# Patient Record
Sex: MALE | Race: BLACK OR AFRICAN AMERICAN | NOT HISPANIC OR LATINO | URBAN - METROPOLITAN AREA
[De-identification: names, ages, dates, MRNs, and addresses within clinical notes are randomized per-mention and may not be internally consistent; named-entity substitution may affect disease eponyms.]

---

## 2017-11-22 ENCOUNTER — HISTORICAL (OUTPATIENT)
Dept: ADMINISTRATIVE | Facility: HOSPITAL | Age: 47
End: 2017-11-22

## 2017-11-27 LAB — FINAL CULTURE: NORMAL

## 2017-11-28 LAB — FINAL CULTURE: NORMAL

## 2017-12-04 ENCOUNTER — HISTORICAL (OUTPATIENT)
Dept: ADMINISTRATIVE | Facility: HOSPITAL | Age: 47
End: 2017-12-04

## 2017-12-10 LAB
FINAL CULTURE: NORMAL
FINAL CULTURE: NORMAL

## 2018-05-19 ENCOUNTER — HISTORICAL (OUTPATIENT)
Dept: ADMINISTRATIVE | Facility: HOSPITAL | Age: 48
End: 2018-05-19

## 2018-05-21 LAB — FINAL CULTURE: NORMAL

## 2018-05-25 LAB
FINAL CULTURE: NORMAL
FINAL CULTURE: NORMAL

## 2019-01-27 ENCOUNTER — HISTORICAL (OUTPATIENT)
Dept: ADMINISTRATIVE | Facility: HOSPITAL | Age: 49
End: 2019-01-27

## 2019-02-02 LAB
FINAL CULTURE: NORMAL
FINAL CULTURE: NORMAL

## 2020-01-04 ENCOUNTER — HISTORICAL (OUTPATIENT)
Dept: ADMINISTRATIVE | Facility: HOSPITAL | Age: 50
End: 2020-01-04

## 2020-01-10 LAB
FINAL CULTURE: NORMAL
FINAL CULTURE: NORMAL

## 2020-05-03 ENCOUNTER — HISTORICAL (OUTPATIENT)
Dept: ADMINISTRATIVE | Facility: HOSPITAL | Age: 50
End: 2020-05-03

## 2020-05-09 LAB
FINAL CULTURE: NORMAL
FINAL CULTURE: NORMAL

## 2020-05-29 ENCOUNTER — HISTORICAL (OUTPATIENT)
Dept: ADMINISTRATIVE | Facility: HOSPITAL | Age: 50
End: 2020-05-29

## 2020-06-03 LAB — FINAL CULTURE: NORMAL

## 2020-06-04 LAB — FINAL CULTURE: NORMAL

## 2020-12-01 ENCOUNTER — HISTORICAL (OUTPATIENT)
Dept: ADMINISTRATIVE | Facility: HOSPITAL | Age: 50
End: 2020-12-01

## 2020-12-03 LAB — FINAL CULTURE: NORMAL

## 2020-12-08 LAB — FINAL CULTURE: NORMAL

## 2023-08-15 ENCOUNTER — HOSPITAL ENCOUNTER (INPATIENT)
Facility: HOSPITAL | Age: 53
LOS: 3 days | Discharge: HOME | DRG: 871 | End: 2023-08-18
Attending: EMERGENCY MEDICINE | Admitting: EMERGENCY MEDICINE
Payer: MEDICAID

## 2023-08-15 DIAGNOSIS — R60.9 SWELLING: ICD-10-CM

## 2023-08-15 DIAGNOSIS — J96.01 ACUTE RESPIRATORY FAILURE WITH HYPOXIA: ICD-10-CM

## 2023-08-15 DIAGNOSIS — N50.812 LEFT TESTICULAR PAIN: ICD-10-CM

## 2023-08-15 DIAGNOSIS — A41.9 SEPSIS, DUE TO UNSPECIFIED ORGANISM, UNSPECIFIED WHETHER ACUTE ORGAN DYSFUNCTION PRESENT: Primary | ICD-10-CM

## 2023-08-15 LAB
ALBUMIN SERPL-MCNC: 3.3 G/DL (ref 3.5–5)
ALBUMIN/GLOB SERPL: 0.8 RATIO (ref 1.1–2)
ALP SERPL-CCNC: 81 UNIT/L (ref 40–150)
ALT SERPL-CCNC: 19 UNIT/L (ref 0–55)
APPEARANCE UR: ABNORMAL
AST SERPL-CCNC: 20 UNIT/L (ref 5–34)
BACTERIA #/AREA URNS AUTO: ABNORMAL /HPF
BASOPHILS # BLD AUTO: 0.02 X10(3)/MCL
BASOPHILS NFR BLD AUTO: 0.1 %
BILIRUB SERPL-MCNC: 1.4 MG/DL
BILIRUB UR QL STRIP.AUTO: NEGATIVE
BUN SERPL-MCNC: 23.1 MG/DL (ref 8.4–25.7)
C TRACH DNA SPEC QL NAA+PROBE: NOT DETECTED
CALCIUM SERPL-MCNC: 9 MG/DL (ref 8.4–10.2)
CHLORIDE SERPL-SCNC: 109 MMOL/L (ref 98–107)
CO2 SERPL-SCNC: 24 MMOL/L (ref 22–29)
COLOR UR: ABNORMAL
CREAT SERPL-MCNC: 1.1 MG/DL (ref 0.73–1.18)
EOSINOPHIL # BLD AUTO: 0.03 X10(3)/MCL (ref 0–0.9)
EOSINOPHIL NFR BLD AUTO: 0.2 %
ERYTHROCYTE [DISTWIDTH] IN BLOOD BY AUTOMATED COUNT: 14.7 % (ref 11.5–17)
FLUAV AG UPPER RESP QL IA.RAPID: NOT DETECTED
FLUBV AG UPPER RESP QL IA.RAPID: NOT DETECTED
GFR SERPLBLD CREATININE-BSD FMLA CKD-EPI: >60 MLS/MIN/1.73/M2
GLOBULIN SER-MCNC: 4.3 GM/DL (ref 2.4–3.5)
GLUCOSE SERPL-MCNC: 128 MG/DL (ref 74–100)
GLUCOSE UR QL STRIP.AUTO: NEGATIVE
GRAN CASTS URNS QL MICRO: ABNORMAL /LPF
HCT VFR BLD AUTO: 36.9 % (ref 42–52)
HGB BLD-MCNC: 11.6 G/DL (ref 14–18)
HIV 1+2 AB+HIV1 P24 AG SERPL QL IA: NONREACTIVE
IMM GRANULOCYTES # BLD AUTO: 0.1 X10(3)/MCL (ref 0–0.04)
IMM GRANULOCYTES NFR BLD AUTO: 0.6 %
KETONES UR QL STRIP.AUTO: ABNORMAL
LACTATE SERPL-SCNC: 1.5 MMOL/L (ref 0.5–2.2)
LEUKOCYTE ESTERASE UR QL STRIP.AUTO: ABNORMAL
LYMPHOCYTES # BLD AUTO: 1.46 X10(3)/MCL (ref 0.6–4.6)
LYMPHOCYTES NFR BLD AUTO: 8.1 %
MCH RBC QN AUTO: 24.4 PG (ref 27–31)
MCHC RBC AUTO-ENTMCNC: 31.4 G/DL (ref 33–36)
MCV RBC AUTO: 77.5 FL (ref 80–94)
MONOCYTES # BLD AUTO: 1.68 X10(3)/MCL (ref 0.1–1.3)
MONOCYTES NFR BLD AUTO: 9.3 %
MUCOUS THREADS URNS QL MICRO: ABNORMAL /LPF
N GONORRHOEA DNA SPEC QL NAA+PROBE: NOT DETECTED
NEUTROPHILS # BLD AUTO: 14.76 X10(3)/MCL (ref 2.1–9.2)
NEUTROPHILS NFR BLD AUTO: 81.7 %
NITRITE UR QL STRIP.AUTO: NEGATIVE
NRBC BLD AUTO-RTO: 0 %
PH UR STRIP.AUTO: 8 [PH]
PLATELET # BLD AUTO: 192 X10(3)/MCL (ref 130–400)
PMV BLD AUTO: ABNORMAL FL
POTASSIUM SERPL-SCNC: 4.3 MMOL/L (ref 3.5–5.1)
PROT SERPL-MCNC: 7.6 GM/DL (ref 6.4–8.3)
PROT UR QL STRIP.AUTO: ABNORMAL
RBC # BLD AUTO: 4.76 X10(6)/MCL (ref 4.7–6.1)
RBC #/AREA URNS AUTO: >100 /HPF
RBC UR QL AUTO: ABNORMAL
SARS-COV-2 RNA RESP QL NAA+PROBE: NOT DETECTED
SODIUM SERPL-SCNC: 145 MMOL/L (ref 136–145)
SOURCE (OHS): NORMAL
SP GR UR STRIP.AUTO: 1.02 (ref 1–1.03)
SQUAMOUS #/AREA URNS AUTO: ABNORMAL /HPF
T PALLIDUM AB SER QL: NONREACTIVE
T VAGINALIS GENITAL QL WET PREP: NORMAL
UROBILINOGEN UR STRIP-ACNC: 1
WBC # SPEC AUTO: 18.05 X10(3)/MCL (ref 4.5–11.5)
WBC #/AREA URNS AUTO: ABNORMAL /HPF

## 2023-08-15 PROCEDURE — 85025 COMPLETE CBC W/AUTO DIFF WBC: CPT | Performed by: EMERGENCY MEDICINE

## 2023-08-15 PROCEDURE — 80053 COMPREHEN METABOLIC PANEL: CPT | Performed by: EMERGENCY MEDICINE

## 2023-08-15 PROCEDURE — 21400001 HC TELEMETRY ROOM

## 2023-08-15 PROCEDURE — 87591 N.GONORRHOEAE DNA AMP PROB: CPT | Performed by: INTERNAL MEDICINE

## 2023-08-15 PROCEDURE — 87040 BLOOD CULTURE FOR BACTERIA: CPT | Performed by: EMERGENCY MEDICINE

## 2023-08-15 PROCEDURE — 0240U COVID/FLU A&B PCR: CPT | Performed by: EMERGENCY MEDICINE

## 2023-08-15 PROCEDURE — 27000221 HC OXYGEN, UP TO 24 HOURS

## 2023-08-15 PROCEDURE — 25000003 PHARM REV CODE 250: Performed by: EMERGENCY MEDICINE

## 2023-08-15 PROCEDURE — 80074 ACUTE HEPATITIS PANEL: CPT | Performed by: INTERNAL MEDICINE

## 2023-08-15 PROCEDURE — 87088 URINE BACTERIA CULTURE: CPT | Performed by: EMERGENCY MEDICINE

## 2023-08-15 PROCEDURE — 25000003 PHARM REV CODE 250: Performed by: PHYSICIAN ASSISTANT

## 2023-08-15 PROCEDURE — 63600175 PHARM REV CODE 636 W HCPCS: Performed by: EMERGENCY MEDICINE

## 2023-08-15 PROCEDURE — 81001 URINALYSIS AUTO W/SCOPE: CPT | Performed by: EMERGENCY MEDICINE

## 2023-08-15 PROCEDURE — 25500020 PHARM REV CODE 255: Performed by: INTERNAL MEDICINE

## 2023-08-15 PROCEDURE — 96360 HYDRATION IV INFUSION INIT: CPT

## 2023-08-15 PROCEDURE — 86780 TREPONEMA PALLIDUM: CPT | Performed by: INTERNAL MEDICINE

## 2023-08-15 PROCEDURE — 99291 CRITICAL CARE FIRST HOUR: CPT

## 2023-08-15 PROCEDURE — 83605 ASSAY OF LACTIC ACID: CPT | Performed by: EMERGENCY MEDICINE

## 2023-08-15 PROCEDURE — 87389 HIV-1 AG W/HIV-1&-2 AB AG IA: CPT | Performed by: INTERNAL MEDICINE

## 2023-08-15 PROCEDURE — 87077 CULTURE AEROBIC IDENTIFY: CPT | Performed by: EMERGENCY MEDICINE

## 2023-08-15 PROCEDURE — 11000001 HC ACUTE MED/SURG PRIVATE ROOM

## 2023-08-15 PROCEDURE — 87210 SMEAR WET MOUNT SALINE/INK: CPT | Performed by: INTERNAL MEDICINE

## 2023-08-15 RX ORDER — ACETAMINOPHEN 325 MG/1
650 TABLET ORAL EVERY 8 HOURS PRN
Status: DISCONTINUED | OUTPATIENT
Start: 2023-08-15 | End: 2023-08-18 | Stop reason: HOSPADM

## 2023-08-15 RX ORDER — ALBUTEROL SULFATE 0.83 MG/ML
2.5 SOLUTION RESPIRATORY (INHALATION) EVERY 6 HOURS PRN
Status: ON HOLD | COMMUNITY
End: 2023-08-18 | Stop reason: HOSPADM

## 2023-08-15 RX ORDER — POLYETHYLENE GLYCOL 3350 17 G/17G
17 POWDER, FOR SOLUTION ORAL DAILY
Status: DISCONTINUED | OUTPATIENT
Start: 2023-08-16 | End: 2023-08-18 | Stop reason: HOSPADM

## 2023-08-15 RX ORDER — QUETIAPINE 400 MG/1
400 TABLET, FILM COATED, EXTENDED RELEASE ORAL NIGHTLY
Status: ON HOLD | COMMUNITY
End: 2023-08-18 | Stop reason: HOSPADM

## 2023-08-15 RX ORDER — IBUPROFEN 200 MG
24 TABLET ORAL
Status: DISCONTINUED | OUTPATIENT
Start: 2023-08-15 | End: 2023-08-18 | Stop reason: HOSPADM

## 2023-08-15 RX ORDER — ZIPRASIDONE HYDROCHLORIDE 40 MG/1
20 CAPSULE ORAL 2 TIMES DAILY
COMMUNITY

## 2023-08-15 RX ORDER — POLYETHYLENE GLYCOL 3350 17 G/17G
POWDER, FOR SOLUTION ORAL
Status: ON HOLD | COMMUNITY
End: 2023-08-18 | Stop reason: HOSPADM

## 2023-08-15 RX ORDER — PANTOPRAZOLE SODIUM 40 MG/1
40 TABLET, DELAYED RELEASE ORAL DAILY
COMMUNITY

## 2023-08-15 RX ORDER — ASPIRIN 325 MG
325 TABLET ORAL DAILY
Status: ON HOLD | COMMUNITY
End: 2023-08-18 | Stop reason: HOSPADM

## 2023-08-15 RX ORDER — DOXYCYCLINE HYCLATE 100 MG
100 TABLET ORAL EVERY 12 HOURS
Status: DISCONTINUED | OUTPATIENT
Start: 2023-08-16 | End: 2023-08-18 | Stop reason: HOSPADM

## 2023-08-15 RX ORDER — QUETIAPINE FUMARATE 200 MG/1
200 TABLET, FILM COATED ORAL DAILY
Status: ON HOLD | COMMUNITY
End: 2023-08-18 | Stop reason: HOSPADM

## 2023-08-15 RX ORDER — GLUCAGON 1 MG
1 KIT INJECTION
Status: DISCONTINUED | OUTPATIENT
Start: 2023-08-15 | End: 2023-08-18 | Stop reason: HOSPADM

## 2023-08-15 RX ORDER — NAPROXEN SODIUM 220 MG/1
81 TABLET, FILM COATED ORAL DAILY
Status: DISCONTINUED | OUTPATIENT
Start: 2023-08-16 | End: 2023-08-15

## 2023-08-15 RX ORDER — ACETAMINOPHEN 650 MG/20.3ML
650 LIQUID ORAL
Status: COMPLETED | OUTPATIENT
Start: 2023-08-15 | End: 2023-08-15

## 2023-08-15 RX ORDER — SODIUM CHLORIDE 9 MG/ML
INJECTION, SOLUTION INTRAVENOUS CONTINUOUS
Status: ACTIVE | OUTPATIENT
Start: 2023-08-15 | End: 2023-08-16

## 2023-08-15 RX ORDER — QUETIAPINE FUMARATE 100 MG/1
200 TABLET, FILM COATED ORAL NIGHTLY
Status: DISCONTINUED | OUTPATIENT
Start: 2023-08-16 | End: 2023-08-18 | Stop reason: HOSPADM

## 2023-08-15 RX ORDER — L. ACIDOPHILUS/L.BULGARICUS 100MM CELL
1 GRANULES IN PACKET (EA) ORAL 2 TIMES DAILY
Status: ON HOLD | COMMUNITY
End: 2023-08-18 | Stop reason: HOSPADM

## 2023-08-15 RX ORDER — ONDANSETRON 2 MG/ML
4 INJECTION INTRAMUSCULAR; INTRAVENOUS EVERY 4 HOURS PRN
Status: DISCONTINUED | OUTPATIENT
Start: 2023-08-15 | End: 2023-08-18 | Stop reason: HOSPADM

## 2023-08-15 RX ORDER — SCOLOPAMINE TRANSDERMAL SYSTEM 1 MG/1
1 PATCH, EXTENDED RELEASE TRANSDERMAL
Status: ON HOLD | COMMUNITY
End: 2023-08-18 | Stop reason: HOSPADM

## 2023-08-15 RX ORDER — ASPIRIN 325 MG
325 TABLET ORAL DAILY
Status: DISCONTINUED | OUTPATIENT
Start: 2023-08-16 | End: 2023-08-18 | Stop reason: HOSPADM

## 2023-08-15 RX ORDER — ASCORBIC ACID 500 MG
500 TABLET ORAL DAILY
Status: ON HOLD | COMMUNITY
End: 2023-08-18 | Stop reason: HOSPADM

## 2023-08-15 RX ORDER — ATORVASTATIN CALCIUM 10 MG/1
20 TABLET, FILM COATED ORAL DAILY
Status: DISCONTINUED | OUTPATIENT
Start: 2023-08-16 | End: 2023-08-18 | Stop reason: HOSPADM

## 2023-08-15 RX ORDER — IBUPROFEN 200 MG
16 TABLET ORAL
Status: DISCONTINUED | OUTPATIENT
Start: 2023-08-15 | End: 2023-08-18 | Stop reason: HOSPADM

## 2023-08-15 RX ORDER — ACETAMINOPHEN 325 MG/1
650 TABLET ORAL EVERY 4 HOURS PRN
Status: DISCONTINUED | OUTPATIENT
Start: 2023-08-15 | End: 2023-08-18 | Stop reason: HOSPADM

## 2023-08-15 RX ORDER — SODIUM CHLORIDE 0.9 % (FLUSH) 0.9 %
10 SYRINGE (ML) INJECTION EVERY 12 HOURS PRN
Status: DISCONTINUED | OUTPATIENT
Start: 2023-08-15 | End: 2023-08-18 | Stop reason: HOSPADM

## 2023-08-15 RX ORDER — IPRATROPIUM BROMIDE AND ALBUTEROL SULFATE 2.5; .5 MG/3ML; MG/3ML
3 SOLUTION RESPIRATORY (INHALATION) EVERY 6 HOURS PRN
Status: DISCONTINUED | OUTPATIENT
Start: 2023-08-16 | End: 2023-08-18 | Stop reason: HOSPADM

## 2023-08-15 RX ORDER — TIOTROPIUM BROMIDE 18 UG/1
18 CAPSULE ORAL; RESPIRATORY (INHALATION) DAILY
Status: ON HOLD | COMMUNITY
End: 2023-08-18 | Stop reason: HOSPADM

## 2023-08-15 RX ADMIN — PIPERACILLIN AND TAZOBACTAM 4.5 G: 4; .5 INJECTION, POWDER, LYOPHILIZED, FOR SOLUTION INTRAVENOUS; PARENTERAL at 03:08

## 2023-08-15 RX ADMIN — VANCOMYCIN HYDROCHLORIDE 1750 MG: 10 INJECTION, POWDER, LYOPHILIZED, FOR SOLUTION INTRAVENOUS at 03:08

## 2023-08-15 RX ADMIN — SODIUM CHLORIDE, POTASSIUM CHLORIDE, SODIUM LACTATE AND CALCIUM CHLORIDE 2598 ML: 600; 310; 30; 20 INJECTION, SOLUTION INTRAVENOUS at 01:08

## 2023-08-15 RX ADMIN — IOPAMIDOL 100 ML: 755 INJECTION, SOLUTION INTRAVENOUS at 07:08

## 2023-08-15 RX ADMIN — SODIUM CHLORIDE: 9 INJECTION, SOLUTION INTRAVENOUS at 06:08

## 2023-08-15 RX ADMIN — ACETAMINOPHEN 650 MG: 650 SOLUTION ORAL at 01:08

## 2023-08-15 NOTE — PROGRESS NOTES
"Pharmacokinetic Initial Assessment: IV Vancomycin    Assessment/Plan:    Initiate intravenous vancomycin with loading dose of 1750 mg once with subsequent doses when random concentrations are less than 20 mcg/mL  Desired empiric serum trough concentration is 15 to 20 mcg/mL  Draw vancomycin random level on 08/16 at 0430.  Pharmacy will continue to follow and monitor vancomycin.      Please contact pharmacy at extension [phone] with any questions regarding this assessment.     Thank you for the consult,   Maria Eugenia Vaughn       Patient brief summary:  Kirill Payan is a 52 y.o. male initiated on antimicrobial therapy with IV Vancomycin for treatment of suspected lower respiratory infection    Drug Allergies:   Review of patient's allergies indicates:  Not on File    Actual Body Weight:   86.6 kg    Renal Function:   Estimated Creatinine Clearance: 81.6 mL/min (based on SCr of 1.1 mg/dL).,     Dialysis Method (if applicable):  SANTHOSH (Scr 0.8-->1.10)    CBC (last 72 hours):  Recent Labs   Lab Result Units 08/15/23  1326   WBC x10(3)/mcL 18.05*   Hgb g/dL 11.6*   Hct % 36.9*   Platelet x10(3)/mcL 192   Mono % % 9.3   Eos % % 0.2   Basophil % % 0.1       Metabolic Panel (last 72 hours):  Recent Labs   Lab Result Units 08/15/23  1327   Sodium Level mmol/L 145   Potassium Level mmol/L 4.3   Chloride mmol/L 109*   Carbon Dioxide mmol/L 24   Glucose Level mg/dL 128*   Blood Urea Nitrogen mg/dL 23.1   Creatinine mg/dL 1.10   Albumin Level g/dL 3.3*   Bilirubin Total mg/dL 1.4   Alkaline Phosphatase unit/L 81   Aspartate Aminotransferase unit/L 20   Alanine Aminotransferase unit/L 19       Drug levels (last 3 results):  No results for input(s): "VANCOMYCINRA", "VANCORANDOM", "VANCOMYCINPE", "VANCOPEAK", "VANCOMYCINTR", "VANCOTROUGH" in the last 72 hours.    Microbiologic Results:  Microbiology Results (last 7 days)       Procedure Component Value Units Date/Time    Blood culture x two cultures. Draw prior to antibiotics. " [[id number]] Collected: 08/15/23 1327    Order Status: Resulted Specimen: Blood Updated: 08/15/23 1417    Blood culture x two cultures. Draw prior to antibiotics. [[id number]] Collected: 08/15/23 1327    Order Status: Sent Specimen: Blood     Respiratory Culture [[id number]]     Order Status: Sent Specimen: Sputum

## 2023-08-15 NOTE — H&P
[facility] Hospital Medicine History & Physical Examination       Patient Name: Kirill Payan  MRN: [id number]  Patient Class: IP- Inpatient   Admission Date: 8/15/2023   Admitting Physician: Rosa Morales MD   Length of Stay: 0  Attending Physician: Rosa Morales MD   Primary Care Provider: No primary care provider on file.  Face-to-Face encounter date: 08/15/2023  Code Status: Full Code  Chief Complaint: Fever (Patient from NH for fever and hypotension.  Pt b/p 111/71 on arrival.  Currently has aspiration pneumonia.  )        Patient information was obtained from patient, patient's family, past medical records and ER records.     MD addendum-    53 yo male , a nursing home resident with PMHx of CVA with aphasia , dysphagia on PEG tube feeds, Rt sided hemiparesis, HTN, Schizophrenia, Depression/ Anxiety was brought in for evaluation of fever and hypoxia. It was reported from NH that  pt had a swollen and tender left  testicle with fever of 102 and had low oxygen on 8/11 needed oxygen via  NC at 2L/min . On arrival to the ED today pt had temp of 99.9, /71, , RR 22 and sating 97% on 2L. Workup include U/S scrotum and testicle showed possible left epididymitis. No penile discharge reported and pt has a male external catheter. CT abd/pelvis showed possible left lower lobe pneumonia , possible proctitis and cystitis. UA suggestive of UTI, WBC 18K, Lactic 1.5. Blood cultures and urine cultures were obtained. Pt was given Vancomycin and Zosyn in the ED and  was called for admission.  O2 wean down to RA with SpO2 >95%    History obtained from reviewing medical records  as pt was not able to provide any history and no family was around     P/E- NAD, Pt is awake , aphasic , nods head to answer question, Lungs- clear , Heart- S1/S2, Abd- obese, soft, BS+, PEG tube in place , Ext- No edema     A/P-  Sepsis due to Urinary tract infection along with other possible source -  epididymitis, proctitis, and left lower lobe pneumonia/ CAP evident on imaging study   H/O CVA with consequent aphasia, dysphagia and PEG tube status   Rt hemiparesis   Leukocytosis in the setting of infection   Microcytic anemia     Hx- HTN, Schizophrenia , Anxiety, Depression    Plan-  Blood cultures x2  Urine culture  Respiratory culture   MRSA PCR  STI workup though pt is at a low risk- Chlamydia/ GC PCR/ Trichomonas/ HIV/Hepatitis panel and RPR with reflex   Antibiotic - Zosyn and Doxycycline are selected to cover above mentioned multiple sources of infection   IVF resuscitation as indicated   RD consult for PEG feeds   Resume home meds once med reconciled         HISTORY OF PRESENT ILLNESS:   Kirill Payan is a 52 y.o. Black or  male with a past medical history of hypertension, depression/anxiety, schizophrenia and CVA with right-sided weakness, aphasic and PEG tube. The patient presented to [facility] on 8/15/2023 with a primary complaint of fever and hypoxia.  Patient oxygen saturation decreased on 08/11/2023 in which he was placed on 2 L nasal cannula.  Fever reached a max of 102° F. patient was also noted to have a swollen testicle with pain while in the nursing home.    Upon presentation to the ED, temperature 99.9° F, heart rate 109, blood pressure 111/71, respiratory rate 22 and SpO2 97% on 2 L nasal cannula.  Labs with WBC 18, lactic acid 1.5, H&H 11.6/36.9.  Chest x-ray with no acute findings.  Ultrasound of the scrotum and testicle revealed findings suggestive of large left varicocele with possible left epididymitis unable to be excluded, low-grade right varicocele.  In ED patient received Tylenol, Zosyn and vancomycin.  He is admitted to hospital medicine services for further medical management.    PAST MEDICAL HISTORY:   Hypertension  Depression/anxiety  Schizophrenia   CVA with right hemiparalysis and aphasic with PEG tube     PAST SURGICAL HISTORY:   Unable to obtain due to  medical condition    ALLERGIES:   No known allergies     FAMILY HISTORY:   Reviewed and negative    SOCIAL HISTORY:   Unable to obtain due to medical condition    HOME MEDICATIONS:   As documented    REVIEW OF SYSTEMS:   Unable to obtain due to medical condition    PHYSICAL EXAM:     VITAL SIGNS: 24 HRS MIN & MAX LAST   Temp  Min: 99.9 °F (37.7 °C)  Max: 99.9 °F (37.7 °C) 99.9 °F (37.7 °C)   BP  Min: 92/59  Max: 111/71 (!) 92/59   Pulse  Min: 109  Max: 117  (!) 112   Resp  Min: 22  Max: 22 (!) 22   SpO2  Min: 96 %  Max: 97 % 96 %       General appearance: Well-developed, well-nourished male in no apparent distress.  No family at bedside.  HEENT: Atraumatic head. Moist mucous membranes of oral cavity.  Lungs: Clear to auscultation bilaterally.   Heart: Regular rate and rhythm.   Abdomen: Soft, non-distended, non-tender. Bowel sounds are normal.  Peg tube in place without erythema or drainage.  Extremities: No cyanosis, clubbing. No deformities.  Skin: No Rash. Warm and dry.  Eschar to lateral left lower extremity.  Neuro: Awake, alert and aphasic.       LABS AND IMAGING:     Recent Labs   Lab 08/15/23  1326   WBC 18.05*   RBC 4.76   HGB 11.6*   HCT 36.9*   MCV 77.5*   MCH 24.4*   MCHC 31.4*   RDW 14.7          Recent Labs   Lab 08/15/23  1327      K 4.3   CO2 24   BUN 23.1   CREATININE 1.10   CALCIUM 9.0   ALBUMIN 3.3*   ALKPHOS 81   ALT 19   AST 20   BILITOT 1.4       Microbiology Results (last 7 days)       Procedure Component Value Units Date/Time    Blood culture x two cultures. Draw prior to antibiotics. [[id number]] Collected: 08/15/23 1327    Order Status: Resulted Specimen: Blood Updated: 08/15/23 1417    Blood culture x two cultures. Draw prior to antibiotics. [[id number]] Collected: 08/15/23 1327    Order Status: Sent Specimen: Blood     Respiratory Culture [[id number]]     Order Status: Sent Specimen: Sputum              US Scrotum And Testicles  EXAMINATION  US SCROTUM AND TESTICLES    CLINICAL  HISTORY  Edema, unspecified    TECHNIQUE  Grayscale and Doppler interrogation of the scrotum and testes.    COMPARISON  None available at the time of initial interpretation.    FINDINGS  Exam quality: adequate for evaluation    Right Testicle: Surface contour intact and smooth. No evidence of focal mass or infiltrative parenchymal abnormality.  No significant enlargement or heterogeneity of the epididymis.    Left Testicle: Surface contour intact and smooth. No evidence of focal mass or infiltrative parenchymal abnormality.  The left epididymis is markedly enlarged and heterogeneous, with appearance of diffuse hypervascularity by Doppler interrogation.    Doppler Interrogation: Spontaneous arterial flow is demonstrated within each testicle, with symmetric velocity and normal low-resistance spectral waveform.  Bilateral venous outflow is maintained.    Other findings: Minimal left hydrocele is present.  No significant right scrotal fluid is identified.  There are findings consistent with moderate to high-grade left varicocele, as well as minimal right varicocele.    Measurements:    *Right testicle: 4.5 cm x 1.8 cm x 2.7 cm (11 cc)  *Left testicle: 3.6 cm x 2.1 cm x 2.4 cm (9 cc)    IMPRESSION  1. Findings suggestive of large left varicocele with possibility of left epididymitis not excluded.  2. Low-grade right varicocele.  3. No definite evidence of active testicular torsion.    Electronically signed by: Umer Bermudez  Date:    08/15/2023  Time:    14:04  X-Ray Chest AP Portable  Narrative: EXAMINATION:  XR CHEST AP PORTABLE    CLINICAL HISTORY:  Sepsis;    COMPARISON:  29 December 2021    FINDINGS:  Portable frontal view of the chest was obtained. The heart is not enlarged.  Improved aeration of the right lung base compared to prior.  No new focal consolidation or pneumothorax.  There is dextroscoliosis.  Impression: No acute findings.    Electronically signed by: Tim  Jonathan  Date:    08/15/2023  Time:    13:03        ASSESSMENT & PLAN:   Assessment:  Large left varicocele, small right varicocele  ?  Left epididymitis  Microcytic anemia  History of hypertension, depression/anxiety, schizophrenia and CVA with right-sided weakness, aphasic and PEG tube    Plan:  - Continue with vancomycin and Zosyn   - UA and blood cultures ordered.  Follow results   - STD panel ordered  - Tylenol as needed for fever   - Resume appropriate home medications when deemed necessary   - Labs in AM      VTE Prophylaxis: will be placed on SCD for DVT prophylaxis and will be advised to be as mobile as possible and sit in a chair as tolerated      __________________________________________________________________________  INPATIENT LIST OF MEDICATIONS     Current Facility-Administered Medications:     0.9%  NaCl infusion, , Intravenous, Continuous, Rosi Dominguez, PA-ARVIND    acetaminophen tablet 650 mg, 650 mg, Oral, Q8H PRN, Rosi Dominguez, PA-C    acetaminophen tablet 650 mg, 650 mg, Oral, Q4H PRN, Rosi Dominguez, PA-ARVIND    dextrose 10% bolus 125 mL 125 mL, 12.5 g, Intravenous, PRN, Rosi Dominguez, PA-C    dextrose 10% bolus 250 mL 250 mL, 25 g, Intravenous, PRN, Rosi Dominguez, PA-ARVIND    glucagon (human recombinant) injection 1 mg, 1 mg, Intramuscular, PRNAlberto Kallie E., PA-C    glucose chewable tablet 16 g, 16 g, Oral, PRN, Rosi Dominguez, PA-C    glucose chewable tablet 24 g, 24 g, Oral, PRN, Rosi Dominguez, PA-C    ondansetron injection 4 mg, 4 mg, Intravenous, Q4H PRN, Rosi Dominguez, PA-ARVIND    piperacillin-tazobactam (ZOSYN) 4.5 g in dextrose 5 % in water (D5W) 100 mL IVPB (MB+), 4.5 g, Intravenous, Once, Jessica Lindsey MD, Last Rate: 200 mL/hr at 08/15/23 1536, 4.5 g at 08/15/23 1536    piperacillin-tazobactam (ZOSYN) 4.5 g in dextrose 5 % in water (D5W) 100 mL IVPB (MB+), 4.5 g, Intravenous, Q8H, Rosi Dominguez PA-C    sodium chloride 0.9% flush 10 mL, 10 mL,  Intravenous, Q12H PRN, Rosi Dominguez PA-C    vancomycin (VANCOCIN) 1,750 mg in dextrose 5 % (D5W) 500 mL IVPB, 1,750 mg, Intravenous, Once, Jessica Lindsey MD    Pharmacy to dose Vancomycin consult, , , Once **AND** vancomycin - pharmacy to dose, , Intravenous, pharmacy to manage frequency, Rosi Dominguez PA-C  No current outpatient medications on file.      Scheduled Meds:   piperacillin-tazobactam (Zosyn) IV (PEDS and ADULTS) (extended infusion is not appropriate)  4.5 g Intravenous Once    piperacillin-tazobactam (Zosyn) IV (PEDS and ADULTS) (extended infusion is not appropriate)  4.5 g Intravenous Q8H    vancomycin (VANCOCIN) IV (PEDS and ADULTS)  1,750 mg Intravenous Once     Continuous Infusions:   sodium chloride 0.9%       PRN Meds:.acetaminophen, acetaminophen, dextrose 10%, dextrose 10%, glucagon (human recombinant), glucose, glucose, ondansetron, sodium chloride 0.9%, Pharmacy to dose Vancomycin consult **AND** vancomycin - pharmacy to dose      Discharge Planning and Disposition: Anticipated discharge to be determined.    I, DAVID Byers, have reviewed and discussed the case with Dr. Rosa Morales MD    Please see the following addendum for further assessment and plan from there attending MD.    Rosi Dominguez PA-C  08/15/2023

## 2023-08-15 NOTE — ED PROVIDER NOTES
Encounter Date: 8/15/2023    SCRIBE #1 NOTE: I, Rola Connelly, vincent scribing for, and in the presence of,  Jessica Lindsey MD. I have scribed the following portions of the note - Other sections scribed: HPI, ROS, PE.       History     Chief Complaint   Patient presents with    Fever     Patient from NH for fever and hypotension.  Pt b/p 111/71 on arrival.  Currently has aspiration pneumonia.       52 year old male presents to the ED via EMS from a nursing home for fever and decreased oxygen saturation. Per EMS the patient's oxygen dropped on Friday and he was started on 2L of oxygen. Prior to Friday, he was not on home oxygen. The patient had a fever of 102 and his CBG was 127 en route. The nursing home informed EMS that the patient has a swollen testicle causing him pain.  EMS notes that the patient is at his baseline. He was diagnosed with pneumonia from aspiration recently.     The history is provided by the EMS personnel. No  was used.     Review of patient's allergies indicates:  Not on File  No past medical history on file.  No past surgical history on file.  No family history on file.     Review of Systems   Constitutional:  Positive for fever. Negative for chills and diaphoresis.   HENT:  Negative for congestion, ear pain, sinus pain and sore throat.    Eyes:  Negative for pain, discharge and visual disturbance.   Respiratory:  Negative for cough, shortness of breath, wheezing and stridor.    Cardiovascular:  Negative for chest pain and palpitations.   Gastrointestinal:  Negative for abdominal pain, constipation, diarrhea, nausea, rectal pain and vomiting.   Genitourinary:  Positive for testicular pain (Left). Negative for dysuria and hematuria.   Musculoskeletal:  Negative for back pain and myalgias.   Skin:  Negative for rash.   Neurological:  Negative for dizziness, syncope, numbness and headaches.   Hematological: Negative.    Psychiatric/Behavioral: Negative.     All other  systems reviewed and are negative.      Physical Exam     Initial Vitals [08/15/23 1232]   BP Pulse Resp Temp SpO2   111/71 109 (!) 22 99.9 °F (37.7 °C) 97 %      MAP       --         Physical Exam    Nursing note and vitals reviewed.  Constitutional: He appears well-developed. He is not diaphoretic. No distress.   Appears generally weak  Ill-appearing but answering questions   HENT:   Head: Normocephalic and atraumatic.   Nose: Nose normal.   Eyes: Conjunctivae and EOM are normal. Pupils are equal, round, and reactive to light.   Neck: Trachea normal. Neck supple.   Normal range of motion.  Cardiovascular:  Regular rhythm, normal heart sounds and intact distal pulses.     Exam reveals no gallop and no friction rub.       No murmur heard.  Tachycardic     Pulmonary/Chest: No respiratory distress. He has no wheezes. He has no rhonchi. He exhibits no tenderness.   Coarse breath sounds bilaterally   Abdominal: Abdomen is soft. Bowel sounds are normal. He exhibits no distension and no mass. There is no abdominal tenderness.   Peg tube in left upper quadrant  There is no rebound.   Genitourinary:    Genitourinary Comments: Left testicle is tender and swollen      Musculoskeletal:         General: No tenderness or edema. Normal range of motion.      Cervical back: Normal range of motion and neck supple.      Lumbar back: Normal. No tenderness. Normal range of motion.      Comments: Cremasteric reflex     Neurological: He is alert.   H/o cva with aphasia and right sided weakness, able to answer yes or no questions   Skin: Skin is warm and dry. Capillary refill takes less than 2 seconds. No rash and no abscess noted. No erythema. No pallor.   Does not appear to have a sacral decubitus cyst   Psychiatric: He has a normal mood and affect. His behavior is normal. Judgment and thought content normal.         ED Course   Critical Care    Date/Time: 8/15/2023 2:53 PM    Performed by: Jessica Lindsey MD  Authorized by:  Jessica Lindsey MD  Direct patient critical care time: 45 minutes  Total critical care time (exclusive of procedural time) : 45 minutes  Critical care was necessary to treat or prevent imminent or life-threatening deterioration of the following conditions: sepsis, circulatory failure and respiratory failure.  Critical care was time spent personally by me on the following activities: development of treatment plan with patient or surrogate, discussions with consultants, evaluation of patient's response to treatment, examination of patient, obtaining history from patient or surrogate, ordering and performing treatments and interventions, ordering and review of laboratory studies, ordering and review of radiographic studies, pulse oximetry, re-evaluation of patient's condition and review of old charts.        Labs Reviewed   COMPREHENSIVE METABOLIC PANEL - Abnormal; Notable for the following components:       Result Value    Chloride 109 (*)     Glucose Level 128 (*)     Albumin Level 3.3 (*)     Globulin 4.3 (*)     Albumin/Globulin Ratio 0.8 (*)     All other components within normal limits   CBC WITH DIFFERENTIAL - Abnormal; Notable for the following components:    WBC 18.05 (*)     Hgb 11.6 (*)     Hct 36.9 (*)     MCV 77.5 (*)     MCH 24.4 (*)     MCHC 31.4 (*)     Neut # 14.76 (*)     Mono # 1.68 (*)     IG# 0.10 (*)     All other components within normal limits   LACTIC ACID, PLASMA - Normal   BLOOD CULTURE [facility]   BLOOD CULTURE [facility]   RESPIRATORY CULTURE ([facility])   CBC W/ AUTO DIFFERENTIAL    Narrative:     The following orders were created for panel order CBC auto differential.  Procedure                               Abnormality         Status                     ---------                               -----------         ------                     CBC with Differential[[id number]]        Abnormal            Final result                 Please view results for these tests on the individual orders.   URINALYSIS,  REFLEX TO URINE CULTURE   COVID/FLU A&B PCR          Imaging Results              US Scrotum And Testicles (Final result)  Result time 08/15/23 14:04:19      Final result by Umer Bermudez MD (08/15/23 14:04:19)                   Narrative:    EXAMINATION  US SCROTUM AND TESTICLES    CLINICAL HISTORY  Edema, unspecified    TECHNIQUE  Grayscale and Doppler interrogation of the scrotum and testes.    COMPARISON  None available at the time of initial interpretation.    FINDINGS  Exam quality: adequate for evaluation    Right Testicle: Surface contour intact and smooth. No evidence of focal mass or infiltrative parenchymal abnormality.  No significant enlargement or heterogeneity of the epididymis.    Left Testicle: Surface contour intact and smooth. No evidence of focal mass or infiltrative parenchymal abnormality.  The left epididymis is markedly enlarged and heterogeneous, with appearance of diffuse hypervascularity by Doppler interrogation.    Doppler Interrogation: Spontaneous arterial flow is demonstrated within each testicle, with symmetric velocity and normal low-resistance spectral waveform.  Bilateral venous outflow is maintained.    Other findings: Minimal left hydrocele is present.  No significant right scrotal fluid is identified.  There are findings consistent with moderate to high-grade left varicocele, as well as minimal right varicocele.      Measurements:    *Right testicle: 4.5 cm x 1.8 cm x 2.7 cm (11 cc)  *Left testicle: 3.6 cm x 2.1 cm x 2.4 cm (9 cc)    IMPRESSION  1. Findings suggestive of large left varicocele with possibility of left epididymitis not excluded.  2. Low-grade right varicocele.  3. No definite evidence of active testicular torsion.      Electronically signed by: Umer Bermudez  Date:    08/15/2023  Time:    14:04                                     X-Ray Chest AP Portable (Final result)  Result time 08/15/23 13:03:13      Final result by Tim Castillo MD (08/15/23 13:03:13)                           Final result by Tim Castillo MD (08/15/23 13:03:11)                   Impression:      No acute findings.      Electronically signed by: Tim Castillo  Date:    08/15/2023  Time:    13:03               Narrative:    EXAMINATION:  XR CHEST AP PORTABLE    CLINICAL HISTORY:  Sepsis;    COMPARISON:  29 December 2021    FINDINGS:  Portable frontal view of the chest was obtained. The heart is not enlarged.  Improved aeration of the right lung base compared to prior.  No new focal consolidation or pneumothorax.  There is dextroscoliosis.                                    X-Rays:   Independently Interpreted Readings:   Chest X-Ray: Normal heart size.  No infiltrates.  No acute abnormalities.     Medications   piperacillin-tazobactam (ZOSYN) 4.5 g in dextrose 5 % in water (D5W) 100 mL IVPB (MB+) (has no administration in time range)   vancomycin (VANCOCIN) 1,750 mg in dextrose 5 % (D5W) 500 mL IVPB (has no administration in time range)   piperacillin-tazobactam (ZOSYN) 4.5 g in dextrose 5 % in water (D5W) 100 mL IVPB (MB+) (has no administration in time range)   lactated ringers bolus 2,598 mL (2,598 mLs Intravenous New Bag 8/15/23 1359)   acetaminophen oral solution 650 mg (650 mg Per G Tube Given 8/15/23 1359)     Medical Decision Making  The differential diagnosis includes, but is not limited to: sepsis, renal failure, pneumonia, cellulitis, or UTI, epididymitis, abscess  Cbc, cmp, lactic, ua, blood cultures, cxr, testicular us ordered and reviewed  Leukocytosis iwthout lactic acidosis, us with ?epididymitis, on exam possibly cellulitis as well, given 30 cc/kg ivf and broad spectrum antibiotics, plan to admit to hospitalist for sepsis  May be due to epididymitis, not sexually active, vanc and zosyn should be sufficient    Problems Addressed:  Acute respiratory failure with hypoxia: acute illness or injury that poses a threat to life or bodily functions  Left testicular pain: acute illness or  "injury that poses a threat to life or bodily functions  Sepsis, due to unspecified organism, unspecified whether acute organ dysfunction present: acute illness or injury that poses a threat to life or bodily functions    Amount and/or Complexity of Data Reviewed  Independent Historian: EMS     Details: Per EMS the patient's oxygen dropped on Friday and he was started on 2L of oxygen. Prior to Friday, he was not on home oxygen. The patient had a fever of 102 and his CBG was 127 en route. The nursing home informed EMS that the patient has a swollen testicle causing him pain.  EMS notes that the patient is at his baseline. He was diagnosed with pneumonia from aspiration recently.  External Data Reviewed: notes.  Labs: ordered. Decision-making details documented in ED Course.  Radiology: ordered and independent interpretation performed. Decision-making details documented in ED Course.    Risk  OTC drugs.  Prescription drug management.  Decision regarding hospitalization.    Critical Care  Total time providing critical care: 45 minutes       Medical Decision Making:   History:   I obtained history from: EMS provider.  Old Medical Records: I decided to obtain old medical records.  Old Records Summarized: records from previous admission(s).  Initial Assessment:   See hpi  Independently Interpreted Test(s):   I have ordered and independently interpreted X-rays - see prior notes.  Clinical Tests:   Lab Tests: Ordered and Reviewed  Radiological Study: Ordered and Reviewed  Sepsis Perfusion Assessment: "I attest a sepsis perfusion exam was performed within 6 hours of sepsis, severe sepsis, or septic shock presentation, following fluid resuscitation."    Sepsis Perfusion Assessment Complete: 8/15/2023 2:56 PM    Other:   I have discussed this case with another health care provider.          Scribe Attestation:   Scribe #1: I performed the above scribed service and the documentation accurately describes the services I performed. " I attest to the accuracy of the note.  Comments: Attending:   Physician Attestation Statement for Scribe #1: IJessica MD, personally performed the services described in this documentation. All medical record entries made by the scribe were at my direction and in my presence.  I have reviewed the chart and agree that the record reflects my personal performance and is accurate and complete.        Attending Attestation:           Physician Attestation for Scribe:  Physician Attestation Statement for Scribe #1: IJessica MD, reviewed documentation, as scribed by Rola Connelly in my presence, and it is both accurate and complete.             ED Course as of 08/15/23 1516   Tue Aug 15, 2023   1322 Reported aspiration pneumonia, does have thickened sputum however there is no evidence of being prescribed abx [BS]   1328 Just got over covid 1 week ago, out of seclusion 6 days ago, today is the testicular swelling and requiring supplemental O2 [BS]   1443 Pt to be straight cath'd for urine [BS]      ED Course User Index  [BS] Jessica Lindsey MD                 Clinical Impression:   Final diagnoses:  [R60.9] Swelling  [A41.9] Sepsis, due to unspecified organism, unspecified whether acute organ dysfunction present (Primary)  [N50.812] Left testicular pain  [J96.01] Acute respiratory failure with hypoxia        ED Disposition Condition    Admit Stable                Jessica Lindsey MD  08/15/23 1516

## 2023-08-16 LAB
ALBUMIN SERPL-MCNC: 2.8 G/DL (ref 3.5–5)
ALBUMIN/GLOB SERPL: 0.8 RATIO (ref 1.1–2)
ALP SERPL-CCNC: 69 UNIT/L (ref 40–150)
ALT SERPL-CCNC: 16 UNIT/L (ref 0–55)
AST SERPL-CCNC: 20 UNIT/L (ref 5–34)
BASOPHILS # BLD AUTO: 0.02 X10(3)/MCL
BASOPHILS NFR BLD AUTO: 0.2 %
BILIRUB SERPL-MCNC: 1.5 MG/DL
BUN SERPL-MCNC: 13.6 MG/DL (ref 8.4–25.7)
CALCIUM SERPL-MCNC: 8.5 MG/DL (ref 8.4–10.2)
CHLORIDE SERPL-SCNC: 110 MMOL/L (ref 98–107)
CO2 SERPL-SCNC: 21 MMOL/L (ref 22–29)
CREAT SERPL-MCNC: 0.82 MG/DL (ref 0.73–1.18)
EOSINOPHIL # BLD AUTO: 0.08 X10(3)/MCL (ref 0–0.9)
EOSINOPHIL NFR BLD AUTO: 0.7 %
ERYTHROCYTE [DISTWIDTH] IN BLOOD BY AUTOMATED COUNT: 14.6 % (ref 11.5–17)
GFR SERPLBLD CREATININE-BSD FMLA CKD-EPI: >60 MLS/MIN/1.73/M2
GLOBULIN SER-MCNC: 3.7 GM/DL (ref 2.4–3.5)
GLUCOSE SERPL-MCNC: 85 MG/DL (ref 74–100)
HAV IGM SERPL QL IA: NONREACTIVE
HBV CORE IGM SERPL QL IA: NONREACTIVE
HBV SURFACE AG SERPL QL IA: NONREACTIVE
HCT VFR BLD AUTO: 31.6 % (ref 42–52)
HCV AB SERPL QL IA: NONREACTIVE
HGB BLD-MCNC: 10.3 G/DL (ref 14–18)
IMM GRANULOCYTES # BLD AUTO: 0.05 X10(3)/MCL (ref 0–0.04)
IMM GRANULOCYTES NFR BLD AUTO: 0.4 %
LYMPHOCYTES # BLD AUTO: 1.25 X10(3)/MCL (ref 0.6–4.6)
LYMPHOCYTES NFR BLD AUTO: 10.2 %
MAGNESIUM SERPL-MCNC: 2.3 MG/DL (ref 1.6–2.6)
MCH RBC QN AUTO: 25.4 PG (ref 27–31)
MCHC RBC AUTO-ENTMCNC: 32.6 G/DL (ref 33–36)
MCV RBC AUTO: 78 FL (ref 80–94)
MONOCYTES # BLD AUTO: 1.18 X10(3)/MCL (ref 0.1–1.3)
MONOCYTES NFR BLD AUTO: 9.7 %
MRSA PCR SCRN (OHS): NOT DETECTED
NEUTROPHILS # BLD AUTO: 9.63 X10(3)/MCL (ref 2.1–9.2)
NEUTROPHILS NFR BLD AUTO: 78.8 %
NRBC BLD AUTO-RTO: 0 %
PHOSPHATE SERPL-MCNC: 3.2 MG/DL (ref 2.3–4.7)
PLATELET # BLD AUTO: 182 X10(3)/MCL (ref 130–400)
PMV BLD AUTO: 13.3 FL (ref 7.4–10.4)
POCT GLUCOSE: 78 MG/DL (ref 70–110)
POTASSIUM SERPL-SCNC: 3.9 MMOL/L (ref 3.5–5.1)
PROT SERPL-MCNC: 6.5 GM/DL (ref 6.4–8.3)
RBC # BLD AUTO: 4.05 X10(6)/MCL (ref 4.7–6.1)
SODIUM SERPL-SCNC: 142 MMOL/L (ref 136–145)
WBC # SPEC AUTO: 12.21 X10(3)/MCL (ref 4.5–11.5)

## 2023-08-16 PROCEDURE — 63600175 PHARM REV CODE 636 W HCPCS: Performed by: INTERNAL MEDICINE

## 2023-08-16 PROCEDURE — 21400001 HC TELEMETRY ROOM

## 2023-08-16 PROCEDURE — 63600175 PHARM REV CODE 636 W HCPCS: Performed by: PHYSICIAN ASSISTANT

## 2023-08-16 PROCEDURE — 25000003 PHARM REV CODE 250: Performed by: INTERNAL MEDICINE

## 2023-08-16 PROCEDURE — 85025 COMPLETE CBC W/AUTO DIFF WBC: CPT | Performed by: PHYSICIAN ASSISTANT

## 2023-08-16 PROCEDURE — 27000221 HC OXYGEN, UP TO 24 HOURS

## 2023-08-16 PROCEDURE — 80053 COMPREHEN METABOLIC PANEL: CPT | Performed by: PHYSICIAN ASSISTANT

## 2023-08-16 PROCEDURE — 84100 ASSAY OF PHOSPHORUS: CPT | Performed by: INTERNAL MEDICINE

## 2023-08-16 PROCEDURE — 25000003 PHARM REV CODE 250: Performed by: PHYSICIAN ASSISTANT

## 2023-08-16 PROCEDURE — 87641 MR-STAPH DNA AMP PROBE: CPT | Performed by: INTERNAL MEDICINE

## 2023-08-16 PROCEDURE — 25000242 PHARM REV CODE 250 ALT 637 W/ HCPCS: Performed by: INTERNAL MEDICINE

## 2023-08-16 PROCEDURE — 83735 ASSAY OF MAGNESIUM: CPT | Performed by: INTERNAL MEDICINE

## 2023-08-16 RX ORDER — ZIPRASIDONE MESYLATE 20 MG/ML
20 INJECTION, POWDER, LYOPHILIZED, FOR SOLUTION INTRAMUSCULAR EVERY 12 HOURS PRN
Status: DISCONTINUED | OUTPATIENT
Start: 2023-08-16 | End: 2023-08-18 | Stop reason: HOSPADM

## 2023-08-16 RX ORDER — DOXYLAMINE SUCCINATE 25 MG
TABLET ORAL 2 TIMES DAILY
Status: DISCONTINUED | OUTPATIENT
Start: 2023-08-16 | End: 2023-08-18 | Stop reason: HOSPADM

## 2023-08-16 RX ORDER — ENOXAPARIN SODIUM 100 MG/ML
40 INJECTION SUBCUTANEOUS EVERY 24 HOURS
Status: DISCONTINUED | OUTPATIENT
Start: 2023-08-16 | End: 2023-08-18 | Stop reason: HOSPADM

## 2023-08-16 RX ADMIN — ENOXAPARIN SODIUM 40 MG: 40 INJECTION SUBCUTANEOUS at 07:08

## 2023-08-16 RX ADMIN — PIPERACILLIN AND TAZOBACTAM 4.5 G: 4; .5 INJECTION, POWDER, LYOPHILIZED, FOR SOLUTION INTRAVENOUS; PARENTERAL at 08:08

## 2023-08-16 RX ADMIN — ACETAMINOPHEN 650 MG: 325 TABLET, FILM COATED ORAL at 08:08

## 2023-08-16 RX ADMIN — DOXYCYCLINE HYCLATE 100 MG: 100 TABLET, COATED ORAL at 08:08

## 2023-08-16 RX ADMIN — ASPIRIN 325 MG ORAL TABLET 325 MG: 325 PILL ORAL at 08:08

## 2023-08-16 RX ADMIN — THERA TABS 1 TABLET: TAB at 08:08

## 2023-08-16 RX ADMIN — PIPERACILLIN AND TAZOBACTAM 4.5 G: 4; .5 INJECTION, POWDER, LYOPHILIZED, FOR SOLUTION INTRAVENOUS; PARENTERAL at 12:08

## 2023-08-16 RX ADMIN — TIOTROPIUM BROMIDE INHALATION SPRAY 2 PUFF: 3.12 SPRAY, METERED RESPIRATORY (INHALATION) at 08:08

## 2023-08-16 RX ADMIN — ATORVASTATIN CALCIUM 20 MG: 10 TABLET, FILM COATED ORAL at 08:08

## 2023-08-16 RX ADMIN — POLYETHYLENE GLYCOL 3350 17 G: 17 POWDER, FOR SOLUTION ORAL at 08:08

## 2023-08-16 RX ADMIN — PIPERACILLIN AND TAZOBACTAM 4.5 G: 4; .5 INJECTION, POWDER, LYOPHILIZED, FOR SOLUTION INTRAVENOUS; PARENTERAL at 03:08

## 2023-08-16 RX ADMIN — SODIUM CHLORIDE: 9 INJECTION, SOLUTION INTRAVENOUS at 11:08

## 2023-08-16 RX ADMIN — QUETIAPINE FUMARATE 200 MG: 100 TABLET ORAL at 08:08

## 2023-08-16 NOTE — CONSULTS
Inpatient Nutrition Assessment    Admit Date: 8/15/2023   Total duration of encounter: 1 day     Nutrition Recommendation/Prescription     Resume NH tube feeding Nutren 2.0 @ 50mL/hr to provide:   2000 kcal (100% est needs)  80g protein (91% est needs)  690 mL free water   *calculations based on tube feeding running over est 20hrs/daily    Rec resume free water flushes 300mL 5x's daily to meet 100% est fluid needs.       Communication of Recommendations: reviewed with provider and reviewed with nurse    Nutrition Assessment     Malnutrition Assessment/Nutrition-Focused Physical Exam     Last assessed on 8/16/23:  Malnutrition Level: other (see comments) (does not meet criteria)  Energy Intake (Malnutrition): other (see comments) (does not meet criteria)  Weight Loss (Malnutrition): other (see comments) (unable to obtain)  Subcutaneous Fat (Malnutrition): other (see comments) (does not meet criteria)           Muscle Mass (Malnutrition): other (see comments) (does not meet criteria)                          Fluid Accumulation (Malnutrition): other (see comments) (does not meet criteria)        A minimum of two characteristics is recommended for diagnosis of either severe or non-severe malnutrition.    Chart Review    Reason Seen: physician consult for PEG tube feeds , MST    Malnutrition Screening Tool Results   Have you recently lost weight without trying?: Unsure  Have you been eating poorly because of a decreased appetite?: No   MST Score: 2     Diagnosis:  Swelling  Sepsis, due to unspecified organism, unspecified whether acute organ dysfunction present (Primary)  Left testicular pain  Acute respiratory failure with hypoxia       Relevant Medical History:  CVA with aphasia, dysphagia on PEG tube feeds, Rt sided hemiparesis, HTN, Schizophrenia, Depression/ Anxiety     Nutrition-Related Medications: MVI. Polyethylene glycol, NaCl IVF's at 100mL/hr  Calorie Containing IV Medications: no significant kcals from  "medications at this time    Nutrition-Related Labs:  : Chl 110    Diet/PN Order: No diet orders on file  Oral Supplement Order: none  Tube Feeding Order: none  Appetite/Oral Intake: NPO/NPO  Factors Affecting Nutritional Intake: NPO  Food/Church/Cultural Preferences: none reported  Food Allergies: no known food allergies    Skin Integrity: wound  Wound(s):   altered skin integrity noted sacral spine and lower leg    Comments    23: Patient hx obtained from chart and nursing home. Noted patient with aphasia. RN from nursing home reports patient tolerating tube feeding via PEG prior to admit. Patient receiving Nutren 2.0 at 50mL/hr over 14hrs/daily from 6pm-8am with 300mL free water flushes 5x daily. After discussion with MD and RN, will resume NH tube feeding order today, however will run over 20hrs estimated daily while in the hospital to better meet est protein-energy needs.      Anthropometrics    Height: 5' 7" (170.2 cm)    Last Weight: 86.6 kg (191 lb) (08/15/23 1232)    BMI (Calculated): 29.9  BMI Classification: overweight (BMI 25-29.9)        Ideal Body Weight (IBW), Male: 148 lb     % Ideal Body Weight, Male (lb): 129.05 %                          Usual Weight Provided By: EMR weight history, not able to obtain UBW at this time. Called nursing home to inquire about any unintentional wt loss, however RN could not verify wt hx.     Wt Readings from Last 5 Encounters:   08/15/23 86.6 kg (191 lb)     Weight Change(s) Since Admission:  Admit Weight: 86.6 kg (191 lb) (08/15/23 1232)  .    Estimated Needs    Weight Used For Calorie Calculations: 86.6 kg (190 lb 14.7 oz)  Energy Calorie Requirements (kcal): 2213-6881 kcal/day (mifflin st jeor x 1-1.2 SF)  Energy Need Method: [place]-St Jeor  Weight Used For Protein Calculations: 86.6 kg (190 lb 14.7 oz)  Protein Requirements:  g/day (1-1.2g/kg/d)  Fluid Requirements (mL): 2003 mL/day (1mL/kcal)  Temp (24hrs), Av.9 °F (37.7 °C), Min:98.6 °F (37 " °C), Max:101.1 °F (38.4 °C)       Enteral Nutrition    Patient not receiving enteral nutrition at this time.    Parenteral Nutrition    Patient not receiving parenteral nutrition support at this time.    Evaluation of Received Nutrient Intake    Calories: not meeting estimated needs  Protein: not meeting estimated needs    Patient Education    Not applicable.    Nutrition Diagnosis     PES: Suboptimal oral intake related to  Hx aphasia and dysphagia as evidenced by PEG tube for nutrition support. (new)    Interventions/Goals     Intervention(s): modified rate of enteral nutrition and collaboration with other providers  Goal: Meet greater than 75% of nutritional needs by follow-up. (new)    Monitoring & Evaluation     Dietitian will monitor energy intake.  Nutrition Risk/Follow-Up: moderate (follow-up in 3-5 days)   Please consult if re-assessment needed sooner.

## 2023-08-17 LAB
ALBUMIN SERPL-MCNC: 2.7 G/DL (ref 3.5–5)
ALBUMIN/GLOB SERPL: 0.8 RATIO (ref 1.1–2)
ALP SERPL-CCNC: 66 UNIT/L (ref 40–150)
ALT SERPL-CCNC: 18 UNIT/L (ref 0–55)
AST SERPL-CCNC: 23 UNIT/L (ref 5–34)
BACTERIA UR CULT: NO GROWTH
BASOPHILS # BLD AUTO: 0.01 X10(3)/MCL
BASOPHILS NFR BLD AUTO: 0.2 %
BILIRUB SERPL-MCNC: 0.9 MG/DL
BUN SERPL-MCNC: 15.2 MG/DL (ref 8.4–25.7)
CALCIUM SERPL-MCNC: 8.1 MG/DL (ref 8.4–10.2)
CHLORIDE SERPL-SCNC: 111 MMOL/L (ref 98–107)
CO2 SERPL-SCNC: 23 MMOL/L (ref 22–29)
CREAT SERPL-MCNC: 0.79 MG/DL (ref 0.73–1.18)
EOSINOPHIL # BLD AUTO: 0.08 X10(3)/MCL (ref 0–0.9)
EOSINOPHIL NFR BLD AUTO: 1.8 %
ERYTHROCYTE [DISTWIDTH] IN BLOOD BY AUTOMATED COUNT: 14.5 % (ref 11.5–17)
GFR SERPLBLD CREATININE-BSD FMLA CKD-EPI: >60 MLS/MIN/1.73/M2
GLOBULIN SER-MCNC: 3.6 GM/DL (ref 2.4–3.5)
GLUCOSE SERPL-MCNC: 113 MG/DL (ref 74–100)
HCT VFR BLD AUTO: 31.6 % (ref 42–52)
HGB BLD-MCNC: 9.9 G/DL (ref 14–18)
IMM GRANULOCYTES # BLD AUTO: 0.02 X10(3)/MCL (ref 0–0.04)
IMM GRANULOCYTES NFR BLD AUTO: 0.4 %
LYMPHOCYTES # BLD AUTO: 1.26 X10(3)/MCL (ref 0.6–4.6)
LYMPHOCYTES NFR BLD AUTO: 27.8 %
MAGNESIUM SERPL-MCNC: 2 MG/DL (ref 1.6–2.6)
MCH RBC QN AUTO: 24.5 PG (ref 27–31)
MCHC RBC AUTO-ENTMCNC: 31.3 G/DL (ref 33–36)
MCV RBC AUTO: 78.2 FL (ref 80–94)
MONOCYTES # BLD AUTO: 0.59 X10(3)/MCL (ref 0.1–1.3)
MONOCYTES NFR BLD AUTO: 13 %
NEUTROPHILS # BLD AUTO: 2.58 X10(3)/MCL (ref 2.1–9.2)
NEUTROPHILS NFR BLD AUTO: 56.8 %
NRBC BLD AUTO-RTO: 0 %
PLATELET # BLD AUTO: 205 X10(3)/MCL (ref 130–400)
PMV BLD AUTO: ABNORMAL FL
POTASSIUM SERPL-SCNC: 3.6 MMOL/L (ref 3.5–5.1)
PROT SERPL-MCNC: 6.3 GM/DL (ref 6.4–8.3)
RBC # BLD AUTO: 4.04 X10(6)/MCL (ref 4.7–6.1)
SODIUM SERPL-SCNC: 141 MMOL/L (ref 136–145)
WBC # SPEC AUTO: 4.54 X10(3)/MCL (ref 4.5–11.5)

## 2023-08-17 PROCEDURE — 36410 VNPNXR 3YR/> PHY/QHP DX/THER: CPT

## 2023-08-17 PROCEDURE — 25000003 PHARM REV CODE 250: Performed by: NURSE PRACTITIONER

## 2023-08-17 PROCEDURE — C1751 CATH, INF, PER/CENT/MIDLINE: HCPCS

## 2023-08-17 PROCEDURE — 25000003 PHARM REV CODE 250: Performed by: PHYSICIAN ASSISTANT

## 2023-08-17 PROCEDURE — 80053 COMPREHEN METABOLIC PANEL: CPT | Performed by: STUDENT IN AN ORGANIZED HEALTH CARE EDUCATION/TRAINING PROGRAM

## 2023-08-17 PROCEDURE — 25000003 PHARM REV CODE 250: Performed by: INTERNAL MEDICINE

## 2023-08-17 PROCEDURE — 63600175 PHARM REV CODE 636 W HCPCS: Performed by: PHYSICIAN ASSISTANT

## 2023-08-17 PROCEDURE — 63600175 PHARM REV CODE 636 W HCPCS: Performed by: INTERNAL MEDICINE

## 2023-08-17 PROCEDURE — 25000003 PHARM REV CODE 250: Performed by: STUDENT IN AN ORGANIZED HEALTH CARE EDUCATION/TRAINING PROGRAM

## 2023-08-17 PROCEDURE — 85025 COMPLETE CBC W/AUTO DIFF WBC: CPT | Performed by: STUDENT IN AN ORGANIZED HEALTH CARE EDUCATION/TRAINING PROGRAM

## 2023-08-17 PROCEDURE — 76937 US GUIDE VASCULAR ACCESS: CPT

## 2023-08-17 PROCEDURE — 83735 ASSAY OF MAGNESIUM: CPT | Performed by: STUDENT IN AN ORGANIZED HEALTH CARE EDUCATION/TRAINING PROGRAM

## 2023-08-17 PROCEDURE — A4216 STERILE WATER/SALINE, 10 ML: HCPCS | Performed by: NURSE PRACTITIONER

## 2023-08-17 PROCEDURE — 21400001 HC TELEMETRY ROOM

## 2023-08-17 RX ORDER — SODIUM CHLORIDE 0.9 % (FLUSH) 0.9 %
10 SYRINGE (ML) INJECTION
Status: DISCONTINUED | OUTPATIENT
Start: 2023-08-17 | End: 2023-08-18 | Stop reason: HOSPADM

## 2023-08-17 RX ORDER — SODIUM CHLORIDE 0.9 % (FLUSH) 0.9 %
10 SYRINGE (ML) INJECTION EVERY 6 HOURS
Status: DISCONTINUED | OUTPATIENT
Start: 2023-08-17 | End: 2023-08-18 | Stop reason: HOSPADM

## 2023-08-17 RX ADMIN — MICONAZOLE NITRATE: 20 CREAM TOPICAL at 11:08

## 2023-08-17 RX ADMIN — ATORVASTATIN CALCIUM 20 MG: 10 TABLET, FILM COATED ORAL at 11:08

## 2023-08-17 RX ADMIN — DOXYCYCLINE HYCLATE 100 MG: 100 TABLET, COATED ORAL at 09:08

## 2023-08-17 RX ADMIN — POLYETHYLENE GLYCOL 3350 17 G: 17 POWDER, FOR SOLUTION ORAL at 11:08

## 2023-08-17 RX ADMIN — Medication 10 ML: at 11:08

## 2023-08-17 RX ADMIN — MICONAZOLE NITRATE: 20 CREAM TOPICAL at 09:08

## 2023-08-17 RX ADMIN — DOXYCYCLINE HYCLATE 100 MG: 100 TABLET, COATED ORAL at 11:08

## 2023-08-17 RX ADMIN — PIPERACILLIN AND TAZOBACTAM 4.5 G: 4; .5 INJECTION, POWDER, LYOPHILIZED, FOR SOLUTION INTRAVENOUS; PARENTERAL at 03:08

## 2023-08-17 RX ADMIN — PIPERACILLIN AND TAZOBACTAM 4.5 G: 4; .5 INJECTION, POWDER, LYOPHILIZED, FOR SOLUTION INTRAVENOUS; PARENTERAL at 11:08

## 2023-08-17 RX ADMIN — PIPERACILLIN AND TAZOBACTAM 4.5 G: 4; .5 INJECTION, POWDER, LYOPHILIZED, FOR SOLUTION INTRAVENOUS; PARENTERAL at 09:08

## 2023-08-17 RX ADMIN — MICONAZOLE NITRATE: 20 CREAM TOPICAL at 04:08

## 2023-08-17 RX ADMIN — QUETIAPINE FUMARATE 200 MG: 100 TABLET ORAL at 09:08

## 2023-08-17 RX ADMIN — ASPIRIN 325 MG ORAL TABLET 325 MG: 325 PILL ORAL at 11:08

## 2023-08-17 RX ADMIN — THERA TABS 1 TABLET: TAB at 11:08

## 2023-08-17 RX ADMIN — ENOXAPARIN SODIUM 40 MG: 40 INJECTION SUBCUTANEOUS at 06:08

## 2023-08-17 NOTE — PROGRESS NOTES
53 yo male , a nursing home resident with PMHx of CVA with aphasia , dysphagia on PEG tube feeds, Rt sided hemiparesis, HTN, Schizophrenia, Depression/ Anxiety was brought in for evaluation of fever and hypoxia. It was reported from NH that  pt had a swollen and tender left  testicle with fever of 102 and had low oxygen on 8/11 needed oxygen via  NC at 2L/min . On arrival to the ED today pt had temp of 99.9, /71, , RR 22 and sating 97% on 2L. Workup include U/S scrotum and testicle showed possible left epididymitis. No penile discharge reported and pt has a male external catheter. CT abd/pelvis showed possible left lower lobe pneumonia , possible proctitis and cystitis. UA suggestive of UTI, WBC 18K, Lactic 1.5. Blood cultures and urine cultures were obtained. Pt was given Vancomycin and Zosyn in the ED and  was called for admission.  O2 wean down to RA with SpO2 >95%     Neck lesions noted suspicion for tinea corporis.       P/E-   NAD, Pt is awake , aphasic , nods head to answer question,   Lungs- clear ,   Heart- S1/S2, Abd- obese, soft, BS+, PEG tube in place ,   Ext- No edema      A/P-  Sepsis due to Urinary tract infection along with other possible source - epididymitis, proctitis, and left lower lobe pneumonia/ CAP evident on imaging study   H/O CVA with consequent aphasia, dysphagia and PEG tube status   Rt hemiparesis   Leukocytosis in the setting of infection   Microcytic anemia      Hx- HTN, Schizophrenia , Anxiety, Depression     Plan-  - follow the cultures  - KOH prep for neck and torso lesions, suspecting tinea corporis, will start miconazole ointment.   Blood cultures x2  Urine culture  Respiratory culture   MRSA PCR  STI workup though pt is at a low risk- Chlamydia/ GC PCR/ Trichomonas/ HIV/Hepatitis panel and RPR with reflex   Antibiotic - Zosyn and Doxycycline are selected to cover above mentioned multiple sources of infection   IVF resuscitation as indicated   RD consult for PEG  feeds   Resume home meds once med reconciled     VITAL SIGNS: 24 HRS MIN & MAX LAST   Temp  Min: 98.6 °F (37 °C)  Max: 101.1 °F (38.4 °C) 99.1 °F (37.3 °C)   BP  Min: 99/64  Max: 127/62 103/77   Pulse  Min: 81  Max: 92  83   Resp  Min: 15  Max: 18 18   SpO2  Min: 94 %  Max: 99 % 95 %       Labs, Microbiology and Imaging were Reviewed.      Microbiology Results (last 7 days)       Procedure Component Value Units Date/Time    Blood culture x two cultures. Draw prior to antibiotics. [[id number]]  (Normal) Collected: 08/15/23 1327    Order Status: Completed Specimen: Blood Updated: 08/16/23 1500     CULTURE, BLOOD ([facility]) No Growth At 24 Hours    JUAN DAVID Prep [[id number]]     Order Status: Sent Specimen: Skin from Neck     Respiratory Culture [[id number]]  (Abnormal) Collected: 08/15/23 1646    Order Status: Completed Specimen: Sputum, Expectorated Updated: 08/16/23 0751     Respiratory Culture Many Gram-negative Rods    Urine culture [[id number]] Collected: 08/15/23 1631    Order Status: Completed Specimen: Urine Updated: 08/16/23 0643     Urine Culture No Growth At 24 Hours    Blood culture x two cultures. Draw prior to antibiotics. [[id number]] Collected: 08/15/23 1327    Order Status: Resulted Specimen: Blood Updated: 08/15/23 2041    Chlamydia/GC, PCR [[id number]] Collected: 08/15/23 1631    Order Status: Completed Specimen: Urine Updated: 08/15/23 1832     Chlamydia trachomatis PCR Not Detected     N. gonorrhea PCR Not Detected     Source Urine    Narrative:      The Xpert CT/NG test, performed on the GeneXpert system is a qualitative in vitro real-time polymerase chain reaction (PCR) test for the automated detected and differentiation for genomic DNA from Chlamydia trachomatis (CT) and/or Neisseria gonorrhoeae (NG).    Trichomonas Prep [place] [[id number]] Collected: 08/15/23 1631    Order Status: Completed Specimen: Urine from Bladder Updated: 08/15/23 1714     TRICHOMONAS([facility]) Negative: No Trichomonas vaginalis observed     STD Urine (CT/GC/Trich) [[id number]] Collected: 08/15/23 1641    Order Status: Canceled Specimen: Urine Updated: 08/15/23 1641               Medications   aspirin  325 mg Per G Tube Daily    atorvastatin  20 mg Per G Tube Daily    doxycycline  100 mg Per G Tube Q12H    enoxparin  40 mg Subcutaneous Q24H (prophylaxis, 1700)    miconazole   Topical (Top) BID    multivitamin  1 tablet Per G Tube Daily    piperacillin-tazobactam (Zosyn) IV (PEDS and ADULTS) (extended infusion is not appropriate)  4.5 g Intravenous Q8H    polyethylene glycol  17 g Per G Tube Daily    QUEtiapine  200 mg Per G Tube QHS    tiotropium bromide  2 puff Inhalation Daily        acetaminophen, acetaminophen, albuterol-ipratropium, dextrose 10%, dextrose 10%, glucagon (human recombinant), glucose, glucose, ondansetron, sodium chloride 0.9%, ziprasidone     Radiology:  CT Abdomen Pelvis With Contrast  Narrative: EXAMINATION:  CT ABDOMEN PELVIS WITH CONTRAST    CLINICAL HISTORY:  Sepsis;    TECHNIQUE:  Helical acquisition through the abdomen and pelvis with IV contrast.  Three plane reconstructions were provided for review. DLP 3183 mGycm. Automatic exposure control, adjustment of mA/kV or iterative reconstruction technique was used to reduce radiation.    COMPARISON:  29 December 2021    FINDINGS:  There are some patchy left basilar opacities.  There is gynecomastia.    There is no significant abnormality of the liver.  There are gallstones.  No significant pericholecystic inflammation.  The spleen, pancreas and adrenals are within normal limits.  There is no hydronephrosis.  No suspicious renal lesion.    No bowel obstruction.  A gastrostomy tube is in place, balloon inflated distal stomach.  There is some rectal wall thickening.  No perforation or abscess.  Small fat containing paraumbilical hernia.    There is bladder wall thickening.  There is some air in the urinary bladder.  No ascites.  Abdominal aorta normal in caliber.  Mild  atherosclerotic disease.    Mild degenerative change of the spine.  Impression: 1. Suspect proctitis.  2. Bladder wall thickening, question cystitis.  3. Possible left lower lobe pneumonia.  4. Other chronic findings above.    Electronically signed by: Tim Castillo  Date:    08/15/2023  Time:    19:07  US Scrotum And Testicles  EXAMINATION  US SCROTUM AND TESTICLES    CLINICAL HISTORY  Edema, unspecified    TECHNIQUE  Grayscale and Doppler interrogation of the scrotum and testes.    COMPARISON  None available at the time of initial interpretation.    FINDINGS  Exam quality: adequate for evaluation    Right Testicle: Surface contour intact and smooth. No evidence of focal mass or infiltrative parenchymal abnormality.  No significant enlargement or heterogeneity of the epididymis.    Left Testicle: Surface contour intact and smooth. No evidence of focal mass or infiltrative parenchymal abnormality.  The left epididymis is markedly enlarged and heterogeneous, with appearance of diffuse hypervascularity by Doppler interrogation.    Doppler Interrogation: Spontaneous arterial flow is demonstrated within each testicle, with symmetric velocity and normal low-resistance spectral waveform.  Bilateral venous outflow is maintained.    Other findings: Minimal left hydrocele is present.  No significant right scrotal fluid is identified.  There are findings consistent with moderate to high-grade left varicocele, as well as minimal right varicocele.    Measurements:    *Right testicle: 4.5 cm x 1.8 cm x 2.7 cm (11 cc)  *Left testicle: 3.6 cm x 2.1 cm x 2.4 cm (9 cc)    IMPRESSION  1. Findings suggestive of large left varicocele with possibility of left epididymitis not excluded.  2. Low-grade right varicocele.  3. No definite evidence of active testicular torsion.    Electronically signed by: Umer Bermudez  Date:    08/15/2023  Time:    14:04  X-Ray Chest AP Portable  Narrative: EXAMINATION:  XR CHEST AP PORTABLE    CLINICAL  HISTORY:  Sepsis;    COMPARISON:  29 December 2021    FINDINGS:  Portable frontal view of the chest was obtained. The heart is not enlarged.  Improved aeration of the right lung base compared to prior.  No new focal consolidation or pneumothorax.  There is dextroscoliosis.  Impression: No acute findings.    Electronically signed by: Tim Castillo  Date:    08/15/2023  Time:    13:03    All diagnosis and differential diagnosis have been reviewed; assessment and plan has been documented; I have personally reviewed the labs and test results that are presently available; I have reviewed the patients medication list; I have reviewed the consulting providers response and recommendations. I have reviewed or attempted to review medical records based upon their availability.       Jalil Kaminski MD   08/16/2023

## 2023-08-17 NOTE — PROCEDURES
"Kirill Payan is a 52 y.o. male patient.    Temp: 97.9 °F (36.6 °C) (08/17/23 0028)  Pulse: 90 (08/17/23 0028)  Resp: 18 (08/16/23 1149)  BP: 102/69 (08/17/23 0028)  SpO2: (!) 90 % (08/17/23 0028)  Weight: 86.6 kg (191 lb) (08/15/23 1232)  Height: 5' 7" (170.2 cm) (08/15/23 1232)    PICC  Date/Time: 8/17/2023 2:02 AM  Performed by: Rainer Singh, RN  Consent Done: Yes  Time out: Immediately prior to procedure a time out was called to verify the correct patient, procedure, equipment, support staff and site/side marked as required  Indications: med administration and vascular access  Anesthesia: local infiltration  Local anesthetic: lidocaine 1% without epinephrine  Anesthetic Total (mL): 5  Preparation: skin prepped with ChloraPrep  Skin prep agent dried: skin prep agent completely dried prior to procedure  Sterile barriers: all five maximum sterile barriers used - cap, mask, sterile gown, sterile gloves, and large sterile sheet  Hand hygiene: hand hygiene performed prior to central venous catheter insertion  Location details: left brachial  Catheter type: single lumen  Catheter size: 4 Fr  Catheter Length: 17cm    Ultrasound guidance: yes  Vessel Caliber: patent, compressibility normal  Vascular Doppler: not done  Needle advanced into vessel with real time Ultrasound guidance.  Guidewire confirmed in vessel.  Sterile sheath used.  no esophageal manometryNumber of attempts: 1  Post-procedure: blood return through all ports, sterile dressing applied and chlorhexidine patch    Assessment: successful placement  Complications: none          Rainer Singh RN  8/17/2023    "

## 2023-08-17 NOTE — PLAN OF CARE
08/17/23 1032   Discharge Assessment   Assessment Type Discharge Planning Assessment   Confirmed/corrected address, phone number and insurance Yes   Confirmed Demographics Correct on Facesheet   Source of Information family  (Brother-in-law: (Primary Contact): [phone])   If unable to respond/provide information was family/caregiver contacted? Yes   Contact Name/Number Brother-in-law: (Primary Contact): [phone]   Communicated AFSHAN with patient/caregiver Date not available/Unable to determine   Reason For Admission R60.9 Swelling  J96.01 Acute respiratory failure with hypoxia  N50.812 Left testicular pain  A41.9 Sepsis, due to unspecified organism, unspecified whether acute organ dysfunction present   People in Home   (Patient is a long-term resident of [facility] ([place], [place]).)   Facility Arrived From: Patient was transported from [facility] to [facility] via ambulance services.   Do you expect to return to your current living situation? Yes   Do you have help at home or someone to help you manage your care at home? Yes   Who are your caregiver(s) and their phone number(s)? Nursing atss at [facility] provide for the patient's needs. Patient was admitted for long-term care on: 11/03/2016.   Prior to hospitilization cognitive status: Not Oriented to Person;Not Oriented to Place;Not Oriented to Time   Current cognitive status: Not Oriented to Person;Not Oriented to Place;Not Oriented to Time   Walking or Climbing Stairs transferring difficulty, requires equipment   Mobility Management Staff at [facility] use a Petros Lift for transfer from the bed to a W/C or Martha-chair PRN.   Dressing/Bathing bathing difficulty, assistance 1 person   Dressing/Bathing Management [facility] and CNA staff ([facility]) provide for ADLs, bathing/showering needs.   Home Accessibility wheelchair accessible   Home Layout Able to live on 1st floor   Equipment  Currently Used at Home wheelchair;lift device   Readmission within 30 days? No   Patient currently being followed by outpatient case management? No   Do you currently have service(s) that help you manage your care at home? Yes   Name and Contact number of agency Patient is along-term resident of [facility] as he was admitted on: 2016.   Is the pt/caregiver preference to resume services with current agency Yes   Do you take prescription medications? Yes   Do you have prescription coverage? Yes   Coverage Payor:  MEDICAID - MEDICAID OF [place]   Do you have any problems affording any of your prescribed medications? No   Is the patient taking medications as prescribed? yes   Who is going to help you get home at discharge? The nursing staff at [facility] provide for his pharmacological needs and dispense medications as per MD orders.   How do you get to doctors appointments? agency   Are you on dialysis? No   Do you take coumadin? No   DME Needed Upon Discharge  none   Discharge Plan discussed with: Caregiver   Name(s) and Number(s) Brother-in-law: Dalton Pulido: [phone]   Transition of Care Barriers None   Discharge Plan A Return to nursing home  (Patient will return to [facility] as he is a long-term resident of this facility since: 2016.)   Discharge Plan B Return to Nursing Home   Social Connections   How often do you attend Sikhism or Tenriism services? Never   Do you belong to any clubs or organizations such as Sikhism groups, unions, fraternal or athletic groups, or school groups? No   How often do you attend meetings of the clubs or organizations you belong to? Never   Are you , , , , never , or living with a partner?    Alcohol Use   Q1: How often do you have a drink containing alcohol? Never   Q2: How many drinks containing alcohol do you have on a typical day when you are drinking? None   Q3: How often do you  have six or more drinks on one occasion? Never

## 2023-08-18 VITALS
TEMPERATURE: 99 F | HEIGHT: 67 IN | BODY MASS INDEX: 29.98 KG/M2 | SYSTOLIC BLOOD PRESSURE: 106 MMHG | HEART RATE: 78 BPM | WEIGHT: 191 LBS | OXYGEN SATURATION: 100 % | RESPIRATION RATE: 20 BRPM | DIASTOLIC BLOOD PRESSURE: 76 MMHG

## 2023-08-18 PROBLEM — J96.01 ACUTE RESPIRATORY FAILURE WITH HYPOXIA: Status: ACTIVE | Noted: 2023-08-18

## 2023-08-18 LAB
ALBUMIN SERPL-MCNC: 2.6 G/DL (ref 3.5–5)
ALBUMIN/GLOB SERPL: 0.7 RATIO (ref 1.1–2)
ALP SERPL-CCNC: 70 UNIT/L (ref 40–150)
ALT SERPL-CCNC: 16 UNIT/L (ref 0–55)
AST SERPL-CCNC: 21 UNIT/L (ref 5–34)
BASOPHILS # BLD AUTO: 0.01 X10(3)/MCL
BASOPHILS NFR BLD AUTO: 0.2 %
BILIRUB SERPL-MCNC: 0.8 MG/DL
BUN SERPL-MCNC: 11.2 MG/DL (ref 8.4–25.7)
CALCIUM SERPL-MCNC: 8.2 MG/DL (ref 8.4–10.2)
CHLORIDE SERPL-SCNC: 108 MMOL/L (ref 98–107)
CO2 SERPL-SCNC: 25 MMOL/L (ref 22–29)
CREAT SERPL-MCNC: 0.79 MG/DL (ref 0.73–1.18)
EOSINOPHIL # BLD AUTO: 0.1 X10(3)/MCL (ref 0–0.9)
EOSINOPHIL NFR BLD AUTO: 2.4 %
ERYTHROCYTE [DISTWIDTH] IN BLOOD BY AUTOMATED COUNT: 14.3 % (ref 11.5–17)
GFR SERPLBLD CREATININE-BSD FMLA CKD-EPI: >60 MLS/MIN/1.73/M2
GLOBULIN SER-MCNC: 3.6 GM/DL (ref 2.4–3.5)
GLUCOSE SERPL-MCNC: 117 MG/DL (ref 74–100)
HCT VFR BLD AUTO: 31.6 % (ref 42–52)
HGB BLD-MCNC: 9.9 G/DL (ref 14–18)
IMM GRANULOCYTES # BLD AUTO: 0.03 X10(3)/MCL (ref 0–0.04)
IMM GRANULOCYTES NFR BLD AUTO: 0.7 %
LYMPHOCYTES # BLD AUTO: 1.31 X10(3)/MCL (ref 0.6–4.6)
LYMPHOCYTES NFR BLD AUTO: 31.6 %
MCH RBC QN AUTO: 24.5 PG (ref 27–31)
MCHC RBC AUTO-ENTMCNC: 31.3 G/DL (ref 33–36)
MCV RBC AUTO: 78.2 FL (ref 80–94)
MONOCYTES # BLD AUTO: 0.66 X10(3)/MCL (ref 0.1–1.3)
MONOCYTES NFR BLD AUTO: 15.9 %
NEUTROPHILS # BLD AUTO: 2.04 X10(3)/MCL (ref 2.1–9.2)
NEUTROPHILS NFR BLD AUTO: 49.2 %
NRBC BLD AUTO-RTO: 0 %
PATH REV: NORMAL
PLATELET # BLD AUTO: 204 X10(3)/MCL (ref 130–400)
PMV BLD AUTO: 13.8 FL (ref 7.4–10.4)
POTASSIUM SERPL-SCNC: 3.8 MMOL/L (ref 3.5–5.1)
PROT SERPL-MCNC: 6.2 GM/DL (ref 6.4–8.3)
RBC # BLD AUTO: 4.04 X10(6)/MCL (ref 4.7–6.1)
SODIUM SERPL-SCNC: 140 MMOL/L (ref 136–145)
WBC # SPEC AUTO: 4.15 X10(3)/MCL (ref 4.5–11.5)

## 2023-08-18 PROCEDURE — 63600175 PHARM REV CODE 636 W HCPCS: Performed by: PHYSICIAN ASSISTANT

## 2023-08-18 PROCEDURE — A4216 STERILE WATER/SALINE, 10 ML: HCPCS | Performed by: NURSE PRACTITIONER

## 2023-08-18 PROCEDURE — 80053 COMPREHEN METABOLIC PANEL: CPT | Performed by: STUDENT IN AN ORGANIZED HEALTH CARE EDUCATION/TRAINING PROGRAM

## 2023-08-18 PROCEDURE — 25000003 PHARM REV CODE 250: Performed by: PHYSICIAN ASSISTANT

## 2023-08-18 PROCEDURE — 85025 COMPLETE CBC W/AUTO DIFF WBC: CPT | Performed by: STUDENT IN AN ORGANIZED HEALTH CARE EDUCATION/TRAINING PROGRAM

## 2023-08-18 PROCEDURE — 25000003 PHARM REV CODE 250: Performed by: NURSE PRACTITIONER

## 2023-08-18 PROCEDURE — 25000003 PHARM REV CODE 250: Performed by: INTERNAL MEDICINE

## 2023-08-18 RX ORDER — ATORVASTATIN CALCIUM 20 MG/1
20 TABLET, FILM COATED ORAL DAILY
Qty: 90 TABLET | Refills: 3 | Status: SHIPPED | OUTPATIENT
Start: 2023-08-18 | End: 2024-08-17

## 2023-08-18 RX ORDER — LEVOFLOXACIN 750 MG/1
750 TABLET ORAL DAILY
Qty: 7 TABLET | Refills: 0 | Status: SHIPPED | OUTPATIENT
Start: 2023-08-18 | End: 2023-08-25

## 2023-08-18 RX ORDER — QUETIAPINE FUMARATE 200 MG/1
200 TABLET, FILM COATED ORAL NIGHTLY
Qty: 30 TABLET | Refills: 11 | Status: SHIPPED | OUTPATIENT
Start: 2023-08-18 | End: 2024-08-17

## 2023-08-18 RX ORDER — ASPIRIN 325 MG
325 TABLET ORAL DAILY
Qty: 30 TABLET | Refills: 3 | Status: SHIPPED | OUTPATIENT
Start: 2023-08-18 | End: 2024-08-17

## 2023-08-18 RX ORDER — POLYETHYLENE GLYCOL 3350 17 G/17G
17 POWDER, FOR SOLUTION ORAL DAILY
Qty: 30 EACH | Refills: 0 | Status: SHIPPED | OUTPATIENT
Start: 2023-08-18

## 2023-08-18 RX ORDER — TIOTROPIUM BROMIDE INHALATION SPRAY 3.12 UG/1
2 SPRAY, METERED RESPIRATORY (INHALATION) DAILY
Qty: 4 G | Refills: 3 | Status: SHIPPED | OUTPATIENT
Start: 2023-08-18

## 2023-08-18 RX ORDER — FLUCONAZOLE 150 MG/1
300 TABLET ORAL WEEKLY
Qty: 4 TABLET | Refills: 0 | Status: SHIPPED | OUTPATIENT
Start: 2023-08-18 | End: 2023-08-26

## 2023-08-18 RX ADMIN — DOXYCYCLINE HYCLATE 100 MG: 100 TABLET, COATED ORAL at 10:08

## 2023-08-18 RX ADMIN — Medication 10 ML: at 06:08

## 2023-08-18 RX ADMIN — ATORVASTATIN CALCIUM 20 MG: 10 TABLET, FILM COATED ORAL at 10:08

## 2023-08-18 RX ADMIN — PIPERACILLIN AND TAZOBACTAM 4.5 G: 4; .5 INJECTION, POWDER, LYOPHILIZED, FOR SOLUTION INTRAVENOUS; PARENTERAL at 04:08

## 2023-08-18 RX ADMIN — MICONAZOLE NITRATE: 20 CREAM TOPICAL at 10:08

## 2023-08-18 RX ADMIN — Medication 10 ML: at 01:08

## 2023-08-18 RX ADMIN — THERA TABS 1 TABLET: TAB at 10:08

## 2023-08-18 RX ADMIN — PIPERACILLIN AND TAZOBACTAM 4.5 G: 4; .5 INJECTION, POWDER, LYOPHILIZED, FOR SOLUTION INTRAVENOUS; PARENTERAL at 01:08

## 2023-08-18 RX ADMIN — ASPIRIN 325 MG ORAL TABLET 325 MG: 325 PILL ORAL at 10:08

## 2023-08-18 NOTE — PLAN OF CARE
23 0932   Discharge Reassessment   Assessment Type Discharge Planning Reassessment   Did the patient's condition or plan change since previous assessment? No   Discharge Plan discussed with: Sibling  (Brother-in-law: Dalton Pulido: [phone])   Name(s) and Number(s) Brother-in-law: Dalton Pulido: [phone]   Communicated AFSHAN with patient/caregiver Yes  (Patient will be discharged from [facility] to [facility] via [facility]. Spoke with Anayeli and placed patient in Will Call/[facility].)   Discharge Plan A Return to nursing home  (FOC: patient is a long-term resident of [facility] ([place], [place]) and will return there today: 2023.)   Discharge Plan B Return to Nursing Home   DME Needed Upon Discharge  none   Transition of Care Barriers None   Why the patient remains in the hospital Requires continued medical care   Post-Acute Status   Post-Acute Authorization Placement   Post-Acute Placement Status Pending post-acute provider review/more information requested   Coverage Payor:  MEDICAID - MEDICAID [facility] Resources/Appts/Education Provided Appointments scheduled and added to AVS   Patient choice form signed by patient/caregiver List with quality metrics by geographic area provided   Discharge Delays None known at this time     Patient will be discharged from [facility] to [facility] ([place], [place]) as he is along-term resident of this facility. He will be transported from [facility] to [facility] via [facility]. Spoke to his family member: Dalton Pulido and informed of the discharge plans for today: 2023.

## 2023-08-18 NOTE — PROGRESS NOTES
[facility] Hospital Medicine Progress Note        Chief Complaint: Inpatient Follow-up for     HPI:   Mrs Payan is a 52 y.o. Black or  male with a past medical history of hypertension, depression/anxiety, schizophrenia and CVA with right-sided weakness, aphasic and PEG tube. The patient presented to [facility] on 8/15/2023 with a primary complaint of fever and hypoxia.  Patient oxygen saturation decreased on 08/11/2023 in which he was placed on 2 L nasal cannula.  Fever reached a max of 102° F. patient was also noted to have a swollen testicle with pain while in the nursing home.     Upon presentation to the ED, temperature 99.9° F, heart rate 109, blood pressure 111/71, respiratory rate 22 and SpO2 97% on 2 L nasal cannula.  Labs with WBC 18, lactic acid 1.5, H&H 11.6/36.9.  Chest x-ray with no acute findings.  Ultrasound of the scrotum and testicle revealed findings suggestive of large left varicocele with possible left epididymitis unable to be excluded, low-grade right varicocele.  In ED patient received Tylenol, Zosyn and vancomycin.  He was admitted to hospital medicine services for further medical management. CT A/P showed possible left lower lobe pneumonia , possible proctitis and cystitis. UA suggestive of UTI, WBC 18K, Lactic 1.5. Blood cultures and urine cultures were obtained. Patient continued on IV Zosyn/Doxycycline.     Interval Hx:   Today, Mr Payan was seen at bedside. He was nonverbal & unable to answer most questions. Able to nod yes or no to some questions, denied any complaints. Will consult ID for durations of treatment recommendations to plan for DC.    Case was discussed with patient's nurse and  on the floor.    Objective/physical exam:  General: awake but minimally responsive male lying in bed, in no acute distress  HENT: oral and oropharyngeal mucosa moist, pink, with no erythema or exudates, no ear pain or discharge  Neck: no neck  movement, no lymph nodes or swellings, no JVD or Carotid bruit  Respiratory: clear breathing sounds bilaterally, no crackles, rales, ronchi or wheezes  Cardiovascular: clear S1 and S2, no murmurs, rubs or gallops  Peripheral Vascular: no lesions, ulcers or erosions, normal peripheral pulses and no pedal edema  Gastrointestinal: PEG in place; soft, non-tender, non-distended abdomen, no guarding, rigidity or rebound tenderness, normal bowel sounds  Integumentary: normal skin color, no rashes or lesions  Neuro: AAO x 0; unable to test motor strength or sensation due to patient's mentation; CN unable to be tested d/t mentation    VITAL SIGNS: 24 HRS MIN & MAX LAST   Temp  Min: 97.9 °F (36.6 °C)  Max: 101.5 °F (38.6 °C) 98.9 °F (37.2 °C)   BP  Min: 102/69  Max: 126/87 116/80   Pulse  Min: 74  Max: 90  82   Resp  Min: 18  Max: 20 20   SpO2  Min: 90 %  Max: 100 % 98 %     I have reviewed the following labs:    Recent Labs   Lab 08/15/23  1326 08/16/23  0434 08/17/23  0418   WBC 18.05* 12.21* 4.54   RBC 4.76 4.05* 4.04*   HGB 11.6* 10.3* 9.9*   HCT 36.9* 31.6* 31.6*   MCV 77.5* 78.0* 78.2*   MCH 24.4* 25.4* 24.5*   MCHC 31.4* 32.6* 31.3*   RDW 14.7 14.6 14.5    182 205   MPV  --  13.3*  --        Recent Labs   Lab 08/15/23  1327 08/16/23  0434 08/17/23  0418    142 141   K 4.3 3.9 3.6   CO2 24 21* 23   BUN 23.1 13.6 15.2   CREATININE 1.10 0.82 0.79   CALCIUM 9.0 8.5 8.1*   MG  --  2.30 2.00   ALBUMIN 3.3* 2.8* 2.7*   ALKPHOS 81 69 66   ALT 19 16 18   AST 20 20 23   BILITOT 1.4 1.5 0.9     Assessment/Plan:  Sepsis 2/2 LLL Pneumonia  Acute Hypoxemic Respiratory Failure 2/2 PNA  Imaging Evidence of UTI  Imaging Evidence of Epididymitis, Proctitis   H/O CVA with consequent aphasia, dysphagia s/p PEG tube  Rt hemiparesis   Microcytic Anemia   HTN  Schizophrenia  Anxiety  Depression    Patient continues to be admitted for close monitoring   Aphasic at baseline with inability to answer most questions; nods yes/no to  some queries; denying any new complaints   Continued on IV Zosyn and PO Doxycycline  Blood cultures negative x 48 hours  Will consult ID for duration of treatment if needed  TF continued via PEG  Respiratory culture growing E coli; Urine Culture negative   Patient continued on  mg daily, atorvastatin 20 mg daily, MiraLax 17 g, Seroquel 200 mg, tiotropium daily  Will continue monitoring patient's symptoms    VTE prophylaxis: Lovenox    Patient condition:  Stable    Anticipated discharge and Disposition:     Pending    All diagnosis and differential diagnosis have been reviewed; assessment and plan has been documented; I have personally reviewed the labs and test results that are presently available; I have reviewed the patients medication list; I have reviewed the consulting providers response and recommendations. I have reviewed or attempted to review medical records based upon their availability    All of the patient's questions have been  addressed and answered. Patient's is agreeable to the above stated plan. I will continue to monitor closely and make adjustments to medical management as needed.  _____________________________________________________________________    Radiology:  I have personally reviewed the following imaging and agree with the radiologist.     CT Abdomen Pelvis With Contrast  Narrative: EXAMINATION:  CT ABDOMEN PELVIS WITH CONTRAST    CLINICAL HISTORY:  Sepsis;    TECHNIQUE:  Helical acquisition through the abdomen and pelvis with IV contrast.  Three plane reconstructions were provided for review. DLP 3183 mGycm. Automatic exposure control, adjustment of mA/kV or iterative reconstruction technique was used to reduce radiation.    COMPARISON:  29 December 2021    FINDINGS:  There are some patchy left basilar opacities.  There is gynecomastia.    There is no significant abnormality of the liver.  There are gallstones.  No significant pericholecystic inflammation.  The spleen, pancreas  and adrenals are within normal limits.  There is no hydronephrosis.  No suspicious renal lesion.    No bowel obstruction.  A gastrostomy tube is in place, balloon inflated distal stomach.  There is some rectal wall thickening.  No perforation or abscess.  Small fat containing paraumbilical hernia.    There is bladder wall thickening.  There is some air in the urinary bladder.  No ascites.  Abdominal aorta normal in caliber.  Mild atherosclerotic disease.    Mild degenerative change of the spine.  Impression: 1. Suspect proctitis.  2. Bladder wall thickening, question cystitis.  3. Possible left lower lobe pneumonia.  4. Other chronic findings above.    Electronically signed by: Tim Castillo  Date:    08/15/2023  Time:    19:07  US Scrotum And Testicles  EXAMINATION  US SCROTUM AND TESTICLES    CLINICAL HISTORY  Edema, unspecified    TECHNIQUE  Grayscale and Doppler interrogation of the scrotum and testes.    COMPARISON  None available at the time of initial interpretation.    FINDINGS  Exam quality: adequate for evaluation    Right Testicle: Surface contour intact and smooth. No evidence of focal mass or infiltrative parenchymal abnormality.  No significant enlargement or heterogeneity of the epididymis.    Left Testicle: Surface contour intact and smooth. No evidence of focal mass or infiltrative parenchymal abnormality.  The left epididymis is markedly enlarged and heterogeneous, with appearance of diffuse hypervascularity by Doppler interrogation.    Doppler Interrogation: Spontaneous arterial flow is demonstrated within each testicle, with symmetric velocity and normal low-resistance spectral waveform.  Bilateral venous outflow is maintained.    Other findings: Minimal left hydrocele is present.  No significant right scrotal fluid is identified.  There are findings consistent with moderate to high-grade left varicocele, as well as minimal right varicocele.    Measurements:    *Right testicle: 4.5 cm x 1.8 cm x  2.7 cm (11 cc)  *Left testicle: 3.6 cm x 2.1 cm x 2.4 cm (9 cc)    IMPRESSION  1. Findings suggestive of large left varicocele with possibility of left epididymitis not excluded.  2. Low-grade right varicocele.  3. No definite evidence of active testicular torsion.    Electronically signed by: Umer Bermudez  Date:    08/15/2023  Time:    14:04  X-Ray Chest AP Portable  Narrative: EXAMINATION:  XR CHEST AP PORTABLE    CLINICAL HISTORY:  Sepsis;    COMPARISON:  29 December 2021    FINDINGS:  Portable frontal view of the chest was obtained. The heart is not enlarged.  Improved aeration of the right lung base compared to prior.  No new focal consolidation or pneumothorax.  There is dextroscoliosis.  Impression: No acute findings.    Electronically signed by: Tim Castillo  Date:    08/15/2023  Time:    13:03      Salvatore Mack MD   08/17/2023

## 2023-08-20 LAB
BACTERIA BLD CULT: NORMAL
BACTERIA BLD CULT: NORMAL
BACTERIA SPEC CULT: ABNORMAL
GRAM STN SPEC: ABNORMAL

## 2023-08-23 ENCOUNTER — PATIENT OUTREACH (OUTPATIENT)
Dept: ADMINISTRATIVE | Facility: CLINIC | Age: 53
End: 2023-08-23
Payer: MEDICAID

## 2023-09-13 NOTE — DISCHARGE SUMMARY
[facility]  Hospital Medicine Discharge Summary    Admit Date: 8/15/2023  Discharge Date and Time: 08/18/2023  Admitting Physician:  Team  Discharging Physician: Salvatore Mack MD.  Primary Care Physician: Jacqueline, Primary Doctor  Consults: Hospital Medicine    Discharge Diagnoses:  Sepsis 2/2 LLL Pneumonia  Acute Hypoxemic Respiratory Failure 2/2 PNA  Imaging Evidence of UTI  Imaging Evidence of Epididymitis, Proctitis   H/O CVA with consequent aphasia, dysphagia s/p PEG tube  Rt hemiparesis   Microcytic Anemia   HTN  Schizophrenia  Anxiety  Depression    Hospital Course:   Mr Payan is a 52 y.o. Black or  male with PMH of hypertension, depression/anxiety, schizophrenia and CVA with right-sided weakness, aphasic and PEG tube. The patient presented to [facility] on 8/15/2023 with a primary complaint of fever and hypoxia.  Patient oxygen saturation decreased on 08/11/2023 in which he was placed on 2 L nasal cannula.  Fever reached a max of 102° F. patient was also noted to have a swollen testicle with pain while in the nursing home.     Upon presentation to the ED, temperature 99.9° F, heart rate 109, blood pressure 111/71, respiratory rate 22 and SpO2 97% on 2 L nasal cannula.  Labs with WBC 18, lactic acid 1.5, H&H 11.6/36.9. Chest x-ray with no acute findings.  Ultrasound of the scrotum and testicle revealed findings suggestive of large left varicocele with possible left epididymitis unable to be excluded, low-grade right varicocele.  In ED patient received Tylenol, Zosyn and vancomycin. He was admitted to hospital medicine services for further medical management. CT A/P showed possible left lower lobe pneumonia , possible proctitis and cystitis. UA suggestive of UTI, WBC 18K, Lactic 1.5. Blood cultures and urine cultures were obtained. Patient continued on IV Zosyn/Doxycycline. BCX negative x 72 hrs; Respiratory culture growing E coli. TF continued via PEG.     Pt was seen  "and examined on the day of discharge. He was nonverbal & unable to answer most questions. Able to nod yes or no to some questions, denied any complaints. Planned for DC on Levaquin via PEG for 7 days & Fluconazole for 2 more days.  Vitals:  VITAL SIGNS: 24 HRS MIN & MAX LAST   No data recorded 99.3 °F (37.4 °C)   No data recorded 106/76   No data recorded  78   No data recorded 20   No data recorded 100 %       Physical Exam:  General: awake but minimally responsive male lying in bed, in no acute distress  HENT: oral and oropharyngeal mucosa moist, pink, with no erythema or exudates, no ear pain or discharge  Neck: no neck movement, no lymph nodes or swellings, no JVD or Carotid bruit  Respiratory: clear breathing sounds bilaterally, no crackles, rales, ronchi or wheezes  Cardiovascular: clear S1 and S2, no murmurs, rubs or gallops  Peripheral Vascular: no lesions, ulcers or erosions, normal peripheral pulses and no pedal edema  Gastrointestinal: PEG in place; soft, non-tender, non-distended abdomen, no guarding, rigidity or rebound tenderness, normal bowel sounds  Integumentary: normal skin color, no rashes or lesions  Neuro: AAO x 0; unable to test motor strength or sensation due to patient's mentation; CN unable to be tested d/t mentation    Procedures Performed: No admission procedures for hospital encounter.     Significant Diagnostic Studies: See Full reports for all details    No results for input(s): "WBC", "RBC", "HGB", "HCT", "MCV", "MCH", "MCHC", "RDW", "PLT", "MPV", "GRAN", "LYMPH", "MONO", "BASO", "NRBC" in the last 168 hours.    No results for input(s): "NA", "K", "CL", "CO2", "ANIONGAP", "BUN", "CREATININE", "GLU", "CALCIUM", "PH", "MG", "ALBUMIN", "PROT", "ALKPHOS", "ALT", "AST", "BILITOT" in the last 168 hours.     Microbiology Results (last 7 days)       Procedure Component Value Units Date/Time    Urine culture [[id number]] Collected: 08/15/23 1631    Order Status: Completed Specimen: Urine " Updated: 08/17/23 0933     Urine Culture No Growth    Direct Prep (Skin, Hair, Nails) [[id number]]     Order Status: Canceled Specimen: Skin from Lesion     JUAN DAVID Prep [[id number]]     Order Status: Canceled Specimen: Lesion from Skin     KOH Prep [[id number]]     Order Status: Canceled Specimen: Skin from Neck     Chlamydia/GC, PCR [[id number]] Collected: 08/15/23 1631    Order Status: Completed Specimen: Urine Updated: 08/15/23 1832     Chlamydia trachomatis PCR Not Detected     N. gonorrhea PCR Not Detected     Source Urine    Narrative:      The Xpert CT/NG test, performed on the [email or web address] system is a qualitative in vitro real-time polymerase chain reaction (PCR) test for the automated detected and differentiation for genomic DNA from Chlamydia trachomatis (CT) and/or Neisseria gonorrhoeae (NG).    Trichomonas Prep [place] [[id number]] Collected: 08/15/23 1631    Order Status: Completed Specimen: Urine from Bladder Updated: 08/15/23 1714     TRICHOMONAS([facility]) Negative: No Trichomonas vaginalis observed    STD Urine (CT/GC/Trich) [[id number]] Collected: 08/15/23 1641    Order Status: Canceled Specimen: Urine Updated: 08/15/23 1641             CT Abdomen Pelvis With Contrast  Narrative: EXAMINATION:  CT ABDOMEN PELVIS WITH CONTRAST    CLINICAL HISTORY:  Sepsis;    TECHNIQUE:  Helical acquisition through the abdomen and pelvis with IV contrast.  Three plane reconstructions were provided for review. DLP 3183 mGycm. Automatic exposure control, adjustment of mA/kV or iterative reconstruction technique was used to reduce radiation.    COMPARISON:  29 December 2021    FINDINGS:  There are some patchy left basilar opacities.  There is gynecomastia.    There is no significant abnormality of the liver.  There are gallstones.  No significant pericholecystic inflammation.  The spleen, pancreas and adrenals are within normal limits.  There is no hydronephrosis.  No suspicious renal lesion.    No bowel obstruction.  A gastrostomy  tube is in place, balloon inflated distal stomach.  There is some rectal wall thickening.  No perforation or abscess.  Small fat containing paraumbilical hernia.    There is bladder wall thickening.  There is some air in the urinary bladder.  No ascites.  Abdominal aorta normal in caliber.  Mild atherosclerotic disease.    Mild degenerative change of the spine.  Impression: 1. Suspect proctitis.  2. Bladder wall thickening, question cystitis.  3. Possible left lower lobe pneumonia.  4. Other chronic findings above.    Electronically signed by: Tim Castillo  Date:    08/15/2023  Time:    19:07  US Scrotum And Testicles  EXAMINATION  US SCROTUM AND TESTICLES    CLINICAL HISTORY  Edema, unspecified    TECHNIQUE  Grayscale and Doppler interrogation of the scrotum and testes.    COMPARISON  None available at the time of initial interpretation.    FINDINGS  Exam quality: adequate for evaluation    Right Testicle: Surface contour intact and smooth. No evidence of focal mass or infiltrative parenchymal abnormality.  No significant enlargement or heterogeneity of the epididymis.    Left Testicle: Surface contour intact and smooth. No evidence of focal mass or infiltrative parenchymal abnormality.  The left epididymis is markedly enlarged and heterogeneous, with appearance of diffuse hypervascularity by Doppler interrogation.    Doppler Interrogation: Spontaneous arterial flow is demonstrated within each testicle, with symmetric velocity and normal low-resistance spectral waveform.  Bilateral venous outflow is maintained.    Other findings: Minimal left hydrocele is present.  No significant right scrotal fluid is identified.  There are findings consistent with moderate to high-grade left varicocele, as well as minimal right varicocele.    Measurements:    *Right testicle: 4.5 cm x 1.8 cm x 2.7 cm (11 cc)  *Left testicle: 3.6 cm x 2.1 cm x 2.4 cm (9 cc)    IMPRESSION  1. Findings suggestive of large left varicocele with  possibility of left epididymitis not excluded.  2. Low-grade right varicocele.  3. No definite evidence of active testicular torsion.    Electronically signed by: Umer Bermudez  Date:    08/15/2023  Time:    14:04  X-Ray Chest AP Portable  Narrative: EXAMINATION:  XR CHEST AP PORTABLE    CLINICAL HISTORY:  Sepsis;    COMPARISON:  29 December 2021    FINDINGS:  Portable frontal view of the chest was obtained. The heart is not enlarged.  Improved aeration of the right lung base compared to prior.  No new focal consolidation or pneumothorax.  There is dextroscoliosis.  Impression: No acute findings.    Electronically signed by: Tim Castillo  Date:    08/15/2023  Time:    13:03         Medication List        START taking these medications      atorvastatin 20 MG tablet  Commonly known as: LIPITOR  1 tablet (20 mg total) by Per G Tube route once daily.     SPIRIVA RESPIMAT 2.5 mcg/actuation inhaler  Generic drug: tiotropium bromide  Inhale 2 puffs into the lungs once daily. Controller  Replaces: tiotropium 18 mcg inhalation capsule            CHANGE how you take these medications      aspirin 325 MG tablet  1 tablet (325 mg total) by Per G Tube route once daily.  What changed:   how to take this  additional instructions     polyethylene glycol 17 gram Pwpk  Commonly known as: GLYCOLAX  17 g by Per G Tube route once daily.  What changed:   how much to take  when to take this     QUEtiapine 200 MG Tab  Commonly known as: SEROQUEL  1 tablet (200 mg total) by Per G Tube route every evening.  What changed:   when to take this  Another medication with the same name was removed. Continue taking this medication, and follow the directions you see here.            CONTINUE taking these medications      multivitamin Tab  1 tablet by Per G Tube route once daily.     pantoprazole 40 MG tablet  Commonly known as: PROTONIX     ziprasidone 40 MG Cap  Commonly known as: GEODON            STOP taking these medications      albuterol 2.5 mg  /3 mL (0.083 %) nebulizer solution  Commonly known as: PROVENTIL     lactobacillus acidophilus & bulgar 100 million cell packet  Commonly known as: LACTINEX     scopolamine 1.3-1.5 mg (1 mg over 3 days)  Commonly known as: TRANSDERM-SCOP     tiotropium 18 mcg inhalation capsule  Commonly known as: SPIRIVA  Replaced by: SPIRIVA RESPIMAT 2.5 mcg/actuation inhaler     VITAMIN C 500 MG tablet  Generic drug: ascorbic acid (vitamin C)            ASK your doctor about these medications      fluconazole 150 MG Tab  Commonly known as: DIFLUCAN  2 tablets (300 mg total) by Per G Tube route once a week. for 2 doses  Ask about: Should I take this medication?     levoFLOXacin 750 MG tablet  Commonly known as: LEVAQUIN  Take 1 tablet (750 mg total) by mouth once daily. for 7 days  Ask about: Should I take this medication?               Where to Get Your Medications        You can get these medications from any pharmacy    Bring a paper prescription for each of these medications  aspirin 325 MG tablet  atorvastatin 20 MG tablet  fluconazole 150 MG Tab  levoFLOXacin 750 MG tablet  multivitamin Tab  polyethylene glycol 17 gram Pwpk  QUEtiapine 200 MG Tab  SPIRIVA RESPIMAT 2.5 mcg/actuation inhaler          Explained in detail to the patient about the discharge plan, medications, and follow-up visits. Pt understands and agrees with the treatment plan  Discharge Disposition: Home or Self Care   Discharged Condition: stable  Diet-    Medications Per DC med rec  Activities as tolerated    For further questions contact hospitalist office    Discharge time 33 minutes    For worsening symptoms, chest pain, shortness of breath, increased abdominal pain, high grade fever, stroke or stroke like symptoms, immediately go to the nearest Emergency Room or call 911 as soon as possible.      Salvatore Mack M.D.

## 2023-11-20 PROBLEM — J96.01 ACUTE RESPIRATORY FAILURE WITH HYPOXIA: Status: RESOLVED | Noted: 2023-08-18 | Resolved: 2023-11-20

## 2024-08-29 NOTE — NURSING
Detail Level: Zone
Nurses Note -- 4 Eyes      8/16/2023   7:36 AM      Skin assessed during: Admit      [] No Altered Skin Integrity Present    []Prevention Measures Documented      [x] Yes- Altered Skin Integrity Present or Discovered   [x] LDA Added if Not in Epic (Describe Wound)   [] New Altered Skin Integrity was Present on Admit and Documented in LDA   [] Wound Image Taken    Wound Care Consulted? Yes    Attending Nurse:  Nidhi Landry RN    Second RN/Staff Member:   Asif Carlson RN          
Pt has no family contact info. Did come from [facility]. [phone]. NH confirmed that POC for pt is sister Yodit Payan &  Ric Pulido  [phone].   
Tele removed and returned. IV discontinued. Patient report given to NH. [facility] ambulance received patient and transported back to NH.   
Render In Strict Bullet Format?: No
Continue Regimen: Isotretinoin 30mg once a day (Pt did not want to increase dosage due to pt has a needle phobia)\\nAquaphor Ointment to lips and dry skin as much as possible

## 2025-03-01 ENCOUNTER — HOSPITAL ENCOUNTER (INPATIENT)
Facility: HOSPITAL | Age: 55
LOS: 5 days | DRG: 441 | End: 2025-03-06
Attending: INTERNAL MEDICINE | Admitting: INTERNAL MEDICINE
Payer: MEDICAID

## 2025-03-01 DIAGNOSIS — R07.9 CHEST PAIN: ICD-10-CM

## 2025-03-01 DIAGNOSIS — K75.9 HEPATITIS: ICD-10-CM

## 2025-03-01 PROCEDURE — 11000001 HC ACUTE MED/SURG PRIVATE ROOM

## 2025-03-02 LAB
ALBUMIN SERPL-MCNC: 2.1 G/DL (ref 3.5–5)
ALBUMIN/GLOB SERPL: 0.4 RATIO (ref 1.1–2)
ALP SERPL-CCNC: 237 UNIT/L (ref 40–150)
ALT SERPL-CCNC: 28 UNIT/L (ref 0–55)
AMMONIA PLAS-MSCNC: 101.6 UMOL/L (ref 18–72)
AMYLASE SERPL-CCNC: 95 UNIT/L (ref 25–125)
ANION GAP SERPL CALC-SCNC: 13 MEQ/L
APTT PPP: 39.8 SECONDS (ref 23.2–33.7)
AST SERPL-CCNC: 63 UNIT/L (ref 5–34)
BASOPHILS # BLD AUTO: 0.02 X10(3)/MCL
BASOPHILS NFR BLD AUTO: 0.3 %
BILIRUB DIRECT SERPL-MCNC: 7.8 MG/DL (ref 0–?)
BILIRUB SERPL-MCNC: 10.4 MG/DL
BUN SERPL-MCNC: 57.3 MG/DL (ref 8.4–25.7)
CALCIUM SERPL-MCNC: 8.4 MG/DL (ref 8.4–10.2)
CHLORIDE SERPL-SCNC: 114 MMOL/L (ref 98–107)
CO2 SERPL-SCNC: 19 MMOL/L (ref 22–29)
CREAT SERPL-MCNC: 4.13 MG/DL (ref 0.72–1.25)
CREAT/UREA NIT SERPL: 14
EOSINOPHIL # BLD AUTO: 0.1 X10(3)/MCL (ref 0–0.9)
EOSINOPHIL NFR BLD AUTO: 1.4 %
ERYTHROCYTE [DISTWIDTH] IN BLOOD BY AUTOMATED COUNT: 15.9 % (ref 11.5–17)
GFR SERPLBLD CREATININE-BSD FMLA CKD-EPI: 16 ML/MIN/1.73/M2
GLOBULIN SER-MCNC: 5 GM/DL (ref 2.4–3.5)
GLUCOSE SERPL-MCNC: 76 MG/DL (ref 74–100)
HAV IGM SERPL QL IA: NONREACTIVE
HBV CORE IGM SERPL QL IA: NONREACTIVE
HBV SURFACE AG SERPL QL IA: NONREACTIVE
HCT VFR BLD AUTO: 28.2 % (ref 42–52)
HCV AB SERPL QL IA: NONREACTIVE
HGB BLD-MCNC: 9.2 G/DL (ref 14–18)
IMM GRANULOCYTES # BLD AUTO: 0.15 X10(3)/MCL (ref 0–0.04)
IMM GRANULOCYTES NFR BLD AUTO: 2.1 %
INR PPP: 1.3
LACTATE SERPL-SCNC: 0.8 MMOL/L (ref 0.5–2.2)
LIPASE SERPL-CCNC: 13 U/L
LYMPHOCYTES # BLD AUTO: 0.72 X10(3)/MCL (ref 0.6–4.6)
LYMPHOCYTES NFR BLD AUTO: 10 %
MAGNESIUM SERPL-MCNC: 3 MG/DL (ref 1.6–2.6)
MCH RBC QN AUTO: 25.5 PG (ref 27–31)
MCHC RBC AUTO-ENTMCNC: 32.6 G/DL (ref 33–36)
MCV RBC AUTO: 78.1 FL (ref 80–94)
MONOCYTES # BLD AUTO: 0.91 X10(3)/MCL (ref 0.1–1.3)
MONOCYTES NFR BLD AUTO: 12.6 %
NEUTROPHILS # BLD AUTO: 5.33 X10(3)/MCL (ref 2.1–9.2)
NEUTROPHILS NFR BLD AUTO: 73.6 %
NRBC BLD AUTO-RTO: 0.7 %
PLATELET # BLD AUTO: 193 X10(3)/MCL (ref 130–400)
PLATELETS.RETICULATED NFR BLD AUTO: 10.5 % (ref 0.9–11.2)
PMV BLD AUTO: ABNORMAL FL
POTASSIUM SERPL-SCNC: 3.7 MMOL/L (ref 3.5–5.1)
PROT SERPL-MCNC: 7.1 GM/DL (ref 6.4–8.3)
PROTHROMBIN TIME: 16.3 SECONDS (ref 12.5–14.5)
RBC # BLD AUTO: 3.61 X10(6)/MCL (ref 4.7–6.1)
SODIUM SERPL-SCNC: 146 MMOL/L (ref 136–145)
WBC # BLD AUTO: 7.23 X10(3)/MCL (ref 4.5–11.5)

## 2025-03-02 PROCEDURE — 83735 ASSAY OF MAGNESIUM: CPT

## 2025-03-02 PROCEDURE — 36415 COLL VENOUS BLD VENIPUNCTURE: CPT

## 2025-03-02 PROCEDURE — 85610 PROTHROMBIN TIME: CPT

## 2025-03-02 PROCEDURE — 82150 ASSAY OF AMYLASE: CPT

## 2025-03-02 PROCEDURE — 80074 ACUTE HEPATITIS PANEL: CPT

## 2025-03-02 PROCEDURE — 63600175 PHARM REV CODE 636 W HCPCS

## 2025-03-02 PROCEDURE — 82248 BILIRUBIN DIRECT: CPT

## 2025-03-02 PROCEDURE — 11000001 HC ACUTE MED/SURG PRIVATE ROOM

## 2025-03-02 PROCEDURE — 83605 ASSAY OF LACTIC ACID: CPT

## 2025-03-02 PROCEDURE — 80053 COMPREHEN METABOLIC PANEL: CPT

## 2025-03-02 PROCEDURE — 63600175 PHARM REV CODE 636 W HCPCS: Performed by: INTERNAL MEDICINE

## 2025-03-02 PROCEDURE — 25000003 PHARM REV CODE 250: Performed by: INTERNAL MEDICINE

## 2025-03-02 PROCEDURE — 82140 ASSAY OF AMMONIA: CPT

## 2025-03-02 PROCEDURE — 83690 ASSAY OF LIPASE: CPT

## 2025-03-02 PROCEDURE — 85730 THROMBOPLASTIN TIME PARTIAL: CPT

## 2025-03-02 PROCEDURE — 25000003 PHARM REV CODE 250

## 2025-03-02 PROCEDURE — 87040 BLOOD CULTURE FOR BACTERIA: CPT

## 2025-03-02 PROCEDURE — 85025 COMPLETE CBC W/AUTO DIFF WBC: CPT

## 2025-03-02 RX ORDER — BISACODYL 10 MG/1
10 SUPPOSITORY RECTAL DAILY PRN
Status: DISCONTINUED | OUTPATIENT
Start: 2025-03-02 | End: 2025-03-06 | Stop reason: HOSPADM

## 2025-03-02 RX ORDER — POLYETHYLENE GLYCOL 3350 17 G/17G
17 POWDER, FOR SOLUTION ORAL 2 TIMES DAILY PRN
Status: DISCONTINUED | OUTPATIENT
Start: 2025-03-02 | End: 2025-03-06 | Stop reason: HOSPADM

## 2025-03-02 RX ORDER — TALC
6 POWDER (GRAM) TOPICAL NIGHTLY PRN
Status: DISCONTINUED | OUTPATIENT
Start: 2025-03-02 | End: 2025-03-06 | Stop reason: HOSPADM

## 2025-03-02 RX ORDER — SODIUM CHLORIDE 0.9 % (FLUSH) 0.9 %
10 SYRINGE (ML) INJECTION
Status: DISCONTINUED | OUTPATIENT
Start: 2025-03-02 | End: 2025-03-06 | Stop reason: HOSPADM

## 2025-03-02 RX ORDER — SODIUM CHLORIDE, SODIUM LACTATE, POTASSIUM CHLORIDE, CALCIUM CHLORIDE 600; 310; 30; 20 MG/100ML; MG/100ML; MG/100ML; MG/100ML
INJECTION, SOLUTION INTRAVENOUS CONTINUOUS
Status: DISCONTINUED | OUTPATIENT
Start: 2025-03-02 | End: 2025-03-03

## 2025-03-02 RX ORDER — IBUPROFEN 200 MG
24 TABLET ORAL
Status: DISCONTINUED | OUTPATIENT
Start: 2025-03-02 | End: 2025-03-06 | Stop reason: HOSPADM

## 2025-03-02 RX ORDER — GLUCAGON 1 MG
1 KIT INJECTION
Status: DISCONTINUED | OUTPATIENT
Start: 2025-03-02 | End: 2025-03-06 | Stop reason: HOSPADM

## 2025-03-02 RX ORDER — IBUPROFEN 200 MG
16 TABLET ORAL
Status: DISCONTINUED | OUTPATIENT
Start: 2025-03-02 | End: 2025-03-06 | Stop reason: HOSPADM

## 2025-03-02 RX ORDER — ALUMINUM HYDROXIDE, MAGNESIUM HYDROXIDE, AND SIMETHICONE 1200; 120; 1200 MG/30ML; MG/30ML; MG/30ML
30 SUSPENSION ORAL 4 TIMES DAILY PRN
Status: DISCONTINUED | OUTPATIENT
Start: 2025-03-02 | End: 2025-03-05

## 2025-03-02 RX ORDER — MUPIROCIN 20 MG/G
OINTMENT TOPICAL 2 TIMES DAILY
Status: DISCONTINUED | OUTPATIENT
Start: 2025-03-02 | End: 2025-03-06 | Stop reason: HOSPADM

## 2025-03-02 RX ADMIN — MUPIROCIN: 20 OINTMENT TOPICAL at 09:03

## 2025-03-02 RX ADMIN — SODIUM CHLORIDE, POTASSIUM CHLORIDE, SODIUM LACTATE AND CALCIUM CHLORIDE: 600; 310; 30; 20 INJECTION, SOLUTION INTRAVENOUS at 09:03

## 2025-03-02 RX ADMIN — LACTULOSE 30 G: 10 SOLUTION ORAL at 09:03

## 2025-03-02 RX ADMIN — PIPERACILLIN SODIUM AND TAZOBACTAM SODIUM 4.5 G: 4; .5 INJECTION, POWDER, LYOPHILIZED, FOR SOLUTION INTRAVENOUS at 09:03

## 2025-03-02 RX ADMIN — RIFAXIMIN 550 MG: 550 TABLET ORAL at 09:03

## 2025-03-02 RX ADMIN — PIPERACILLIN SODIUM AND TAZOBACTAM SODIUM 4.5 G: 4; .5 INJECTION, POWDER, LYOPHILIZED, FOR SOLUTION INTRAVENOUS at 05:03

## 2025-03-02 RX ADMIN — PIPERACILLIN SODIUM AND TAZOBACTAM SODIUM 4.5 G: 4; .5 INJECTION, POWDER, LYOPHILIZED, FOR SOLUTION INTRAVENOUS at 03:03

## 2025-03-02 NOTE — AI DETERIORATION ALERT
Artificial Intelligence Notification  [facility]  [address]  [place] [address]  Phone: [phone]    This documentation was triggered by an Artificial Intelligence Notification:    Admit Date: 3/1/2025   LOS: 1  Code Status: Full Code  : 1970  Age: 54 y.o.  Weight:   Wt Readings from Last 1 Encounters:   08/15/23 86.6 kg (191 lb)        Sex: male  Bed: 805/805 A  MRN: [id number]  Attending Physician: Kuldip Haq MD     Date of Alert: 2025  Time AI Alert Received: 1000            Vitals:    25 0845   BP: 104/69   Pulse: 85   Resp: 20   Temp: 98.4 °F (36.9 °C)     SpO2: 96 %      Artificial Intelligence alert discussed with Provider:     Name: jesus cervantes   Date/Time of Provider Notification:3/2 at 80900      Patient Condition: vss, plan to readmit to nursing home soon. Spoke with attending nurse, pt at baseline. Laying in bed, resp unlabored, nonverbal. Will cont to be available. No sepsis suspected at present

## 2025-03-02 NOTE — NURSING
Artificial Intelligence Notification  [facility]  [address]  [place] [address]  Phone: [phone]     This documentation was triggered by an Artificial Intelligence Notification.     Admit Date: 3/1/2025   LOS: 1  Code Status: Full Code  : 1970  Age: 54 y.o.  Weight:   Wt Readings from Last 1 Encounters:   08/15/23 86.6 kg (191 lb)        Sex: male  Bed: 805/5 A  MRN: [id number]  Attending Physician: Bernabe Garcia MD     Date of Alert: 2025  Time AI Alert Received: 0150             Vitals:    25 2331   BP: 120/83   Pulse: 95   Resp: 18   Temp: 99.9 °F (37.7 °C)       Artificial Intelligence alert discussed with Provider:     Name: .   Date/Time of Provider Notification: .      Patient Condition: Pt admitted for acute hepatitis and biliary obstruction. Multiple lab imbalances. Vital signs WNL.

## 2025-03-02 NOTE — CONSULTS
GI CONSULT      Reason for Consultation: jaundice, elevated bili    HPI:  54-year-old man with history of CVA who was nonverbal, peg tube, cholecystectomy who presented to [facility] with altered mental status and also was found to be jaundice.  His bilirubin was found to be around 10.  The history is taken from the chart as patient is nonverbal.  Patient was transferred to [place] as there was suspicion that he may need an ERCP.  His direct bilirubin was 7.8.    Patient also noted to have a creatinine of 4.13.  His last creatinine in epic from 2024 around a year ago was normal.    Patient also found to have an elevated ammonia at 1:01 a.m..        Ultrasound 03/01/2025:  And alkalosis fluid in gallbladder fossa    CT scan noncontrast abdomen pelvis:  2.9 x 1.6 x 1.9 cm fluid density in gallbladder fossa.  There was no biliary ductal dilation.  There is a large amount of stool in the colon.  PEG tube is in place.      Review of patient's allergies indicates:  No Known Allergies       Current Medications[1]    Prescriptions Prior to Admission[2]      Past Medical History:    No past medical history on file.   Stroke,    Past Surgical History:    No past surgical history on file.   Cholecystectomy  Family History:    No family history on file.    Social History:    Social History     Tobacco Use    Smoking status: Not on file    Smokeless tobacco: Not on file   Substance Use Topics    Alcohol use: Not on file            Review of Systems:    Review of Systems   Unable to perform ROS: Patient nonverbal         Objective:        VITALS:     Temp Readings from Last 3 Encounters:   03/02/25 97.2 °F (36.2 °C) (Axillary)   08/18/23 99.3 °F (37.4 °C) (Axillary)     BP Readings from Last 3 Encounters:   03/02/25 102/69   08/18/23 106/76     Pulse Readings from Last 3 Encounters:   03/02/25 80   08/18/23 78            Intake/Output Summary (Last 24 hours) at 3/2/2025 1337  Last data filed at 3/2/2025 1200  Gross  per 24 hour   Intake 0 ml   Output 400 ml   Net -400 ml         Physical Exam  Vitals reviewed.   Constitutional:       Appearance: He is ill-appearing.   HENT:      Head: Normocephalic and atraumatic.   Eyes:      General: Scleral icterus present.      Extraocular Movements: Extraocular movements intact.   Cardiovascular:      Rate and Rhythm: Normal rate.   Pulmonary:      Effort: Pulmonary effort is normal. No respiratory distress.      Breath sounds: No stridor. No wheezing or rales.   Abdominal:      General: There is distension.      Palpations: Abdomen is soft. There is no mass.      Tenderness: There is no abdominal tenderness. There is no guarding or rebound.      Hernia: No hernia is present.      Comments: PEG tube is in place.   Neurological:      Mental Status: He is alert. Mental status is at baseline.             Recent Results (from the past 48 hours)   Comprehensive Metabolic Panel    Collection Time: 03/02/25  1:07 AM   Result Value Ref Range    Sodium 146 (H) 136 - 145 mmol/L    Potassium 3.7 3.5 - 5.1 mmol/L    Chloride 114 (H) 98 - 107 mmol/L    CO2 19 (L) 22 - 29 mmol/L    Glucose 76 74 - 100 mg/dL    Blood Urea Nitrogen 57.3 (H) 8.4 - 25.7 mg/dL    Creatinine 4.13 (H) 0.72 - 1.25 mg/dL    Calcium 8.4 8.4 - 10.2 mg/dL    Protein Total 7.1 6.4 - 8.3 gm/dL    Albumin 2.1 (L) 3.5 - 5.0 g/dL    Globulin 5.0 (H) 2.4 - 3.5 gm/dL    Albumin/Globulin Ratio 0.4 (L) 1.1 - 2.0 ratio    Bilirubin Total 10.4 (H) <=1.5 mg/dL     (H) 40 - 150 unit/L    ALT 28 0 - 55 unit/L    AST 63 (H) 5 - 34 unit/L    eGFR 16 mL/min/1.73/m2    Anion Gap 13.0 mEq/L    BUN/Creatinine Ratio 14    Amylase    Collection Time: 03/02/25  1:07 AM   Result Value Ref Range    Amylase Level 95 25 - 125 unit/L   Lipase    Collection Time: 03/02/25  1:07 AM   Result Value Ref Range    Lipase Level 13 <=60 U/L   Ammonia    Collection Time: 03/02/25  1:07 AM   Result Value Ref Range    Ammonia Level 101.6 (H) 18.0 - 72.0 umol/L    Protime-INR    Collection Time: 03/02/25  1:07 AM   Result Value Ref Range    PT 16.3 (H) 12.5 - 14.5 seconds    INR 1.3 <=1.3   APTT    Collection Time: 03/02/25  1:07 AM   Result Value Ref Range    PTT 39.8 (H) 23.2 - 33.7 seconds   Hepatitis Panel, Acute    Collection Time: 03/02/25  1:07 AM   Result Value Ref Range    Hep A IgM Interp Nonreactive Nonreactive    Hep B Core IgM Interp Nonreactive Nonreactive    Hep BsAg Interp Nonreactive Nonreactive    Hep C Ab Interp Nonreactive Nonreactive   Bilirubin, Direct    Collection Time: 03/02/25  1:07 AM   Result Value Ref Range    Bilirubin Direct 7.8 (H) 0.0 - <0.5 mg/dL   CBC with Differential    Collection Time: 03/02/25  1:07 AM   Result Value Ref Range    WBC 7.23 4.50 - 11.50 x10(3)/mcL    RBC 3.61 (L) 4.70 - 6.10 x10(6)/mcL    Hgb 9.2 (L) 14.0 - 18.0 g/dL    Hct 28.2 (L) 42.0 - 52.0 %    MCV 78.1 (L) 80.0 - 94.0 fL    MCH 25.5 (L) 27.0 - 31.0 pg    MCHC 32.6 (L) 33.0 - 36.0 g/dL    RDW 15.9 11.5 - 17.0 %    Platelet 193 130 - 400 x10(3)/mcL    MPV      IPF 10.5 0.9 - 11.2 %    Neut % 73.6 %    Lymph % 10.0 %    Mono % 12.6 %    Eos % 1.4 %    Basophil % 0.3 %    Imm Grans % 2.1 %    Neut # 5.33 2.1 - 9.2 x10(3)/mcL    Lymph # 0.72 0.6 - 4.6 x10(3)/mcL    Mono # 0.91 0.1 - 1.3 x10(3)/mcL    Eos # 0.10 0 - 0.9 x10(3)/mcL    Baso # 0.02 <=0.2 x10(3)/mcL    Imm Gran # 0.15 (H) 0.00 - 0.04 x10(3)/mcL    NRBC% 0.7 %   Lactic Acid, Plasma    Collection Time: 03/02/25  1:07 AM   Result Value Ref Range    Lactic Acid Level 0.8 0.5 - 2.2 mmol/L   Magnesium    Collection Time: 03/02/25  1:07 AM   Result Value Ref Range    Magnesium Level 3.00 (H) 1.60 - 2.60 mg/dL           No results found.          Assessment & Plan:   54-year-old man with history of CVA who was nonverbal, peg tube, cholecystectomy who presented to [facility] with altered mental status and also was found to be jaundice.  His bilirubin was found to be around 10.  The history is taken from  the chart as patient is nonverbal.  Patient was transferred to [place] as there was suspicion that he may need an ERCP.  His direct bilirubin was 7.8.  He was also found to have creat of 4.       Jaundice\elevated bili---multiple etiologies.  Patient does not appear to have any ductal dilatation.  I would suspect if he had a biliary problem, he will have ductal dilatation seen on ultrasound or CT scan.  Nonetheless, we have ordered an MRCP.  It seems that he was able to do this at [facility] in 2024 so I do not see any reason why he could not do that now.           I do also think it is possible that he could have some intrahepatic cholestasis from chronic liver disease.  Noted ammonia of 101.  Patient is getting some lactulose and rifaximin which is a good idea right now.    Hepatitis panel negative      2. Acute kidney injury versus chronic kidney disease----we will see how his creatinine trends over the next few days.    3. Peg tube dependent    4. History of stroke and nonverbal okay           [1]   Current Facility-Administered Medications   Medication Dose Route Frequency Provider Last Rate Last Admin    aluminum-magnesium hydroxide-simethicone 200-200-20 mg/5 mL suspension 30 mL  30 mL Oral QID PRN Stephany Clinesa, FNP        bisacodyL suppository 10 mg  10 mg Rectal Daily PRN Stephany Clinesa, FNP        dextrose 50% injection 12.5 g  12.5 g Intravenous PRN Marlyn Karla, FNP        dextrose 50% injection 25 g  25 g Intravenous PRN Marlyn Karla, FNP        glucagon (human recombinant) injection 1 mg  1 mg Intramuscular PRN Stephany Cilnesa, FNP        glucose chewable tablet 16 g  16 g Oral PRN Marlyn Karla, FNP        glucose chewable tablet 24 g  24 g Oral PRN Stephany Clinesa, FNP        lactated ringers infusion   Intravenous Continuous Kuldip Haq  mL/hr at 03/02/25 0942 New Bag at 03/02/25 0942    lactulose 10 gram/15 ml solution 30 g  30 g Per G Tube TID Kuldip Haq MD         melatonin tablet 6 mg  6 mg Oral Nightly PRN Karla Cline, SAVANNAHP        mupirocin 2 % ointment   Nasal BID Karla Cline FNP   Given at 03/02/25 0941    piperacillin-tazobactam (ZOSYN) 4.5 g in D5W 100 mL IVPB (MB+)  4.5 g Intravenous Q8H Karla Cline FNP 25 mL/hr at 03/02/25 0955 4.5 g at 03/02/25 0955    polyethylene glycol packet 17 g  17 g Oral BID PRN Karla Cline, FNP        rifAXIMin tablet 550 mg  550 mg Per G Tube BID Kuldip Haq MD        sodium chloride 0.9% flush 10 mL  10 mL Intravenous PRN Karla Cline FNP       [2]   Medications Prior to Admission   Medication Sig Dispense Refill Last Dose/Taking    aspirin 325 MG tablet 1 tablet (325 mg total) by Per G Tube route once daily. 30 tablet 3     atorvastatin (LIPITOR) 20 MG tablet 1 tablet (20 mg total) by Per G Tube route once daily. 90 tablet 3     multivitamin Tab 1 tablet by Per G Tube route once daily. 30 tablet 3     pantoprazole (PROTONIX) 40 MG tablet Take 40 mg by mouth once daily. Per Gtube       polyethylene glycol (GLYCOLAX) 17 gram PwPk 17 g by Per G Tube route once daily. 30 each 0     QUEtiapine (SEROQUEL) 200 MG Tab 1 tablet (200 mg total) by Per G Tube route every evening. 30 tablet 11     tiotropium bromide (SPIRIVA RESPIMAT) 2.5 mcg/actuation inhaler Inhale 2 puffs into the lungs once daily. Controller 4 g 3     ziprasidone (GEODON) 40 MG Cap Take 20 mg by mouth 2 (two) times daily. Per G tube

## 2025-03-02 NOTE — H&P
[facility] Hospital Medicine History & Physical Examination       Patient Name: Kirill Payan  MRN: [id number]  Patient Class: IP- Inpatient   Admission Date: 3/1/2025   Admitting Physician: JANUARY Service   Length of Stay: 1  Attending Physician: Kuldip Haq MD  Primary Care Provider: Jacqueline, Primary Doctor  Face-to-Face encounter date: 03/02/2025  Code Status:full  Chief Complaint: No chief complaint on file.      Screening for Social Drivers for health:  Patient screened for food insecurity, housing instability, transportation needs, utility difficulties, and interpersonal safety (select all that apply as identified as concern)  []Housing or Food  []Transportation Needs  []Utility Difficulties  []Interpersonal safety  [x]None      Patient information was obtained from patient, patient's family, past medical records and ER records.  ED records were reviewed in detail and documented below    HISTORY OF PRESENT ILLNESS:   Kirill Payan is a 54 y.o. male who  has no past medical history on file.. The patient presented to [facility] on 3/1/2025 with a primary complaint of elevated liver enzymes      53M w/ hx HTN, HLD, schizophrenia, CVA with residual expressive aphasia [place] [facility] [place].  Patient seen at bedside is aphasic.    Chart reviewed with vitals stable on room air   Labs significant for bilirubin levels of 10.4, markedly elevated, ammonia levels of 101.6, ALP levels of 237, AST 63, ALT 28, creatinine of 4.13, BUN 57.3, CO2 19, sodium 146, hemoglobin 9.2 grams/dL     Patient is admitted to hospitalist service for further management of hyperbilirubinemia and hepatic encephalopathy.  GI has been consulted currently awaiting MRCP       Prior chart shows  2/2025--he had abdominal pain, found to have acute cholecystitis.  Patient was admitted with GI and surgery consultations.  He underwent lap herberth 2/11/24.  He received a 10 day course  of zosyn.  IOC during lap herberth was negative for intraductal abnormality however he had persistent hyperbilirubinemia on lab post op concerning for possible retained stone.  He was monitored closely however bili did eventually trend down to normal.  He developed acute pancreatitis post op on 2/13/24 with markedly elevated lipase and inflammatory changes of the pancreas on CT.  This was treated conservatively with bowel rest, pain control, IV fluids.  He is now clinically improved, tolerating a diet, pain is control, bowels are moving.  He is stable for DC back to the nursing home.  F/u with facility provider in 1 week.       Admitted to hospitalist service for further management       PAST MEDICAL HISTORY:   No past medical history on file.    PAST SURGICAL HISTORY:   No past surgical history on file.    ALLERGIES:   Patient has no known allergies.    FAMILY HISTORY:   Reviewed and negative    SOCIAL HISTORY:     Social History     Tobacco Use    Smoking status: Not on file    Smokeless tobacco: Not on file   Substance Use Topics    Alcohol use: Not on file        HOME MEDICATIONS:     Prior to Admission medications    Medication Sig Start Date End Date Taking? Authorizing Provider   aspirin 325 MG tablet 1 tablet (325 mg total) by Per G Tube route once daily. 8/18/23 8/17/24  Salvatore Mack MD   atorvastatin (LIPITOR) 20 MG tablet 1 tablet (20 mg total) by Per G Tube route once daily. 8/18/23 8/17/24  Salvatore Mack MD   multivitamin Tab 1 tablet by Per G Tube route once daily. 8/18/23   Salvatore Mack MD   pantoprazole (PROTONIX) 40 MG tablet Take 40 mg by mouth once daily. Per Gtube    Provider, Historical   polyethylene glycol (GLYCOLAX) 17 gram PwPk 17 g by Per G Tube route once daily. 8/18/23   Salvatore Mack MD   QUEtiapine (SEROQUEL) 200 MG Tab 1 tablet (200 mg total) by Per G Tube route every evening. 8/18/23 8/17/24  Salvatore Mack MD   tiotropium bromide (SPIRIVA RESPIMAT) 2.5 mcg/actuation inhaler  Inhale 2 puffs into the lungs once daily. Controller 8/18/23   Salvatore Mack MD   ziprasidone (GEODON) 40 MG Cap Take 20 mg by mouth 2 (two) times daily. Per G tube    Provider, Historical       REVIEW OF SYSTEMS:   Except as documented, all other systems reviewed and negative     PHYSICAL EXAM:     VITAL SIGNS: 24 HRS MIN & MAX LAST   Temp  Min: 98.9 °F (37.2 °C)  Max: 99.9 °F (37.7 °C) 98.9 °F (37.2 °C)   BP  Min: 104/72  Max: 127/80 104/72   Pulse  Min: 86  Max: 96  86   Resp  Min: 18  Max: 18 18   SpO2  Min: 97 %  Max: 97 % 97 %     GENERAL: Awake and in NAD  HEENT: NC/AT, EOMI, PERRL.  NECK: Supple  LUNGS: CTA B/L,  CVS: RRR, S1S2 positive  GI/: Soft, NT/ND, bowel sounds positive. Peg tube   EXTREMITIES: Peripheral pulses 2+, no peripheral edema  DERM: Warm, dry.  No rashes or lesions noted.  NEURO:  aphasic, chronic deficits from prior stroke   PSYCH: Cooperative, appropriate mood and affect     LABS AND IMAGING:     Recent Labs   Lab 03/02/25 0107   WBC 7.23   RBC 3.61*   HGB 9.2*   HCT 28.2*   MCV 78.1*   MCH 25.5*   MCHC 32.6*   RDW 15.9          Recent Labs   Lab 03/02/25 0107   *   K 3.7   *   CO2 19*   BUN 57.3*   CREATININE 4.13*   CALCIUM 8.4   MG 3.00*   ALBUMIN 2.1*   ALKPHOS 237*   ALT 28   AST 63*   BILITOT 10.4*       Microbiology Results (last 7 days)       Procedure Component Value Units Date/Time    Blood Culture [[id number]] Collected: 03/02/25 0107    Order Status: Resulted Specimen: Blood, Venous Updated: 03/02/25 0113    Blood Culture [[id number]] Collected: 03/02/25 0107    Order Status: Resulted Specimen: Blood, Venous Updated: 03/02/25 0113             CT Abdomen Pelvis With Contrast  Narrative: EXAMINATION:  CT ABDOMEN PELVIS WITH CONTRAST    CLINICAL HISTORY:  Sepsis;    TECHNIQUE:  Helical acquisition through the abdomen and pelvis with IV contrast.  Three plane reconstructions were provided for review. DLP 3183 mGycm. Automatic exposure control, adjustment of  mA/kV or iterative reconstruction technique was used to reduce radiation.    COMPARISON:  29 December 2021    FINDINGS:  There are some patchy left basilar opacities.  There is gynecomastia.    There is no significant abnormality of the liver.  There are gallstones.  No significant pericholecystic inflammation.  The spleen, pancreas and adrenals are within normal limits.  There is no hydronephrosis.  No suspicious renal lesion.    No bowel obstruction.  A gastrostomy tube is in place, balloon inflated distal stomach.  There is some rectal wall thickening.  No perforation or abscess.  Small fat containing paraumbilical hernia.    There is bladder wall thickening.  There is some air in the urinary bladder.  No ascites.  Abdominal aorta normal in caliber.  Mild atherosclerotic disease.    Mild degenerative change of the spine.  Impression: 1. Suspect proctitis.  2. Bladder wall thickening, question cystitis.  3. Possible left lower lobe pneumonia.  4. Other chronic findings above.    Electronically signed by: Tim Castillo  Date:    08/15/2023  Time:    19:07  US Scrotum And Testicles  EXAMINATION  US SCROTUM AND TESTICLES    CLINICAL HISTORY  Edema, unspecified    TECHNIQUE  Grayscale and Doppler interrogation of the scrotum and testes.    COMPARISON  None available at the time of initial interpretation.    FINDINGS  Exam quality: adequate for evaluation    Right Testicle: Surface contour intact and smooth. No evidence of focal mass or infiltrative parenchymal abnormality.  No significant enlargement or heterogeneity of the epididymis.    Left Testicle: Surface contour intact and smooth. No evidence of focal mass or infiltrative parenchymal abnormality.  The left epididymis is markedly enlarged and heterogeneous, with appearance of diffuse hypervascularity by Doppler interrogation.    Doppler Interrogation: Spontaneous arterial flow is demonstrated within each testicle, with symmetric velocity and normal low-resistance  spectral waveform.  Bilateral venous outflow is maintained.    Other findings: Minimal left hydrocele is present.  No significant right scrotal fluid is identified.  There are findings consistent with moderate to high-grade left varicocele, as well as minimal right varicocele.    Measurements:    *Right testicle: 4.5 cm x 1.8 cm x 2.7 cm (11 cc)  *Left testicle: 3.6 cm x 2.1 cm x 2.4 cm (9 cc)    IMPRESSION  1. Findings suggestive of large left varicocele with possibility of left epididymitis not excluded.  2. Low-grade right varicocele.  3. No definite evidence of active testicular torsion.    Electronically signed by: Umer Bermudez  Date:    08/15/2023  Time:    14:04  X-Ray Chest AP Portable  Narrative: EXAMINATION:  XR CHEST AP PORTABLE    CLINICAL HISTORY:  Sepsis;    COMPARISON:  29 December 2021    FINDINGS:  Portable frontal view of the chest was obtained. The heart is not enlarged.  Improved aeration of the right lung base compared to prior.  No new focal consolidation or pneumothorax.  There is dextroscoliosis.  Impression: No acute findings.    Electronically signed by: Tim Castillo  Date:    08/15/2023  Time:    13:03      ASSESSMENT & PLAN:   Hyperbilirubinemia   Elevated ALP levels   Hepatic encephalopathy with elevated ammonia levels   Anemia, mild   Mildly elevated sodium levels   Metabolic acidosis   SANTHOSH versus CKD   PEG tube status    History of prior CVA with aphasia and PEG tube in place, schizophrenia     Plan  Admit patient   Telemetry monitoring   GI has been consulted   Follow up with MRCP results   Continue IV Zosyn   Remain NPO   Add on IV LR at 100 cc/hour   Trend CMP daily   Will need lactulose and rifaximin after MRCP completed  Home meds currently on hold we need to resume his psych meds sooner     DVT prophylaxis SCDs for now         VTE Prophylaxis: will be placed on  SCD for DVT prophylaxis and will be advised to be as mobile as possible and sit in a chair as tolerated    Patient  condition:  /Guarded/ Serious/ Critical    __________________________________________________________________________  INPATIENT LIST OF MEDICATIONS     Scheduled Meds:   mupirocin   Nasal BID    piperacillin-tazobactam (Zosyn) IV (PEDS and ADULTS) (extended infusion is not appropriate)  4.5 g Intravenous Q8H     Continuous Infusions:   lactated ringers   Intravenous Continuous         PRN Meds:.  Current Facility-Administered Medications:     aluminum-magnesium hydroxide-simethicone, 30 mL, Oral, QID PRN    bisacodyL, 10 mg, Rectal, Daily PRN    dextrose 50%, 12.5 g, Intravenous, PRN    dextrose 50%, 25 g, Intravenous, PRN    glucagon (human recombinant), 1 mg, Intramuscular, PRN    glucose, 16 g, Oral, PRN    glucose, 24 g, Oral, PRN    melatonin, 6 mg, Oral, Nightly PRN    polyethylene glycol, 17 g, Oral, BID PRN    sodium chloride 0.9%, 10 mL, Intravenous, PRN      Kuldip Haq MD   03/02/2025

## 2025-03-03 LAB
ALBUMIN SERPL-MCNC: 2 G/DL (ref 3.5–5)
ALBUMIN/GLOB SERPL: 0.5 RATIO (ref 1.1–2)
ALP SERPL-CCNC: 211 UNIT/L (ref 40–150)
ALT SERPL-CCNC: 29 UNIT/L (ref 0–55)
ANION GAP SERPL CALC-SCNC: 15 MEQ/L
AST SERPL-CCNC: 70 UNIT/L (ref 5–34)
BASOPHILS # BLD AUTO: 0.03 X10(3)/MCL
BASOPHILS NFR BLD AUTO: 0.4 %
BILIRUB SERPL-MCNC: 12.3 MG/DL
BUN SERPL-MCNC: 64.2 MG/DL (ref 8.4–25.7)
CALCIUM SERPL-MCNC: 7.9 MG/DL (ref 8.4–10.2)
CHLORIDE SERPL-SCNC: 116 MMOL/L (ref 98–107)
CO2 SERPL-SCNC: 17 MMOL/L (ref 22–29)
CREAT SERPL-MCNC: 4.8 MG/DL (ref 0.72–1.25)
CREAT/UREA NIT SERPL: 13
EOSINOPHIL # BLD AUTO: 0.03 X10(3)/MCL (ref 0–0.9)
EOSINOPHIL NFR BLD AUTO: 0.4 %
ERYTHROCYTE [DISTWIDTH] IN BLOOD BY AUTOMATED COUNT: 16 % (ref 11.5–17)
FERRITIN SERPL-MCNC: 824.81 NG/ML (ref 21.81–274.66)
GFR SERPLBLD CREATININE-BSD FMLA CKD-EPI: 14 ML/MIN/1.73/M2
GLOBULIN SER-MCNC: 4.2 GM/DL (ref 2.4–3.5)
GLUCOSE SERPL-MCNC: 75 MG/DL (ref 74–100)
HCT VFR BLD AUTO: 26.5 % (ref 42–52)
HGB BLD-MCNC: 8.8 G/DL (ref 14–18)
IMM GRANULOCYTES # BLD AUTO: 0.11 X10(3)/MCL (ref 0–0.04)
IMM GRANULOCYTES NFR BLD AUTO: 1.6 %
IRON SATN MFR SERPL: 57 % (ref 20–50)
IRON SERPL-MCNC: 108 UG/DL (ref 65–175)
LYMPHOCYTES # BLD AUTO: 0.6 X10(3)/MCL (ref 0.6–4.6)
LYMPHOCYTES NFR BLD AUTO: 8.6 %
MCH RBC QN AUTO: 25.7 PG (ref 27–31)
MCHC RBC AUTO-ENTMCNC: 33.2 G/DL (ref 33–36)
MCV RBC AUTO: 77.5 FL (ref 80–94)
MONOCYTES # BLD AUTO: 1.02 X10(3)/MCL (ref 0.1–1.3)
MONOCYTES NFR BLD AUTO: 14.6 %
NEUTROPHILS # BLD AUTO: 5.22 X10(3)/MCL (ref 2.1–9.2)
NEUTROPHILS NFR BLD AUTO: 74.4 %
NRBC BLD AUTO-RTO: 1.9 %
PLATELET # BLD AUTO: 202 X10(3)/MCL (ref 130–400)
PLATELETS.RETICULATED NFR BLD AUTO: 10.6 % (ref 0.9–11.2)
PMV BLD AUTO: ABNORMAL FL
POTASSIUM SERPL-SCNC: 3.8 MMOL/L (ref 3.5–5.1)
PROT SERPL-MCNC: 6.2 GM/DL (ref 6.4–8.3)
RBC # BLD AUTO: 3.42 X10(6)/MCL (ref 4.7–6.1)
SODIUM SERPL-SCNC: 148 MMOL/L (ref 136–145)
TIBC SERPL-MCNC: 191 UG/DL (ref 250–450)
TIBC SERPL-MCNC: 83 UG/DL (ref 60–240)
TRANSFERRIN SERPL-MCNC: 171 MG/DL (ref 174–364)
WBC # BLD AUTO: 7.01 X10(3)/MCL (ref 4.5–11.5)

## 2025-03-03 PROCEDURE — 25000003 PHARM REV CODE 250

## 2025-03-03 PROCEDURE — 99223 1ST HOSP IP/OBS HIGH 75: CPT | Mod: ,,, | Performed by: INTERNAL MEDICINE

## 2025-03-03 PROCEDURE — 63600175 PHARM REV CODE 636 W HCPCS: Performed by: INTERNAL MEDICINE

## 2025-03-03 PROCEDURE — 83550 IRON BINDING TEST: CPT | Performed by: INTERNAL MEDICINE

## 2025-03-03 PROCEDURE — 63600175 PHARM REV CODE 636 W HCPCS

## 2025-03-03 PROCEDURE — 0003M LIVER DIS 10 ASSAYS W/NASH: CPT | Performed by: INTERNAL MEDICINE

## 2025-03-03 PROCEDURE — 11000001 HC ACUTE MED/SURG PRIVATE ROOM

## 2025-03-03 PROCEDURE — 82728 ASSAY OF FERRITIN: CPT | Performed by: INTERNAL MEDICINE

## 2025-03-03 PROCEDURE — 80053 COMPREHEN METABOLIC PANEL: CPT | Performed by: INTERNAL MEDICINE

## 2025-03-03 PROCEDURE — 36415 COLL VENOUS BLD VENIPUNCTURE: CPT | Performed by: INTERNAL MEDICINE

## 2025-03-03 PROCEDURE — 85025 COMPLETE CBC W/AUTO DIFF WBC: CPT | Performed by: INTERNAL MEDICINE

## 2025-03-03 PROCEDURE — 25000003 PHARM REV CODE 250: Performed by: INTERNAL MEDICINE

## 2025-03-03 RX ADMIN — RIFAXIMIN 550 MG: 550 TABLET ORAL at 08:03

## 2025-03-03 RX ADMIN — SODIUM BICARBONATE: 84 INJECTION, SOLUTION INTRAVENOUS at 09:03

## 2025-03-03 RX ADMIN — LACTULOSE 30 G: 10 SOLUTION ORAL at 08:03

## 2025-03-03 RX ADMIN — LACTULOSE 30 G: 10 SOLUTION ORAL at 04:03

## 2025-03-03 RX ADMIN — SODIUM BICARBONATE: 84 INJECTION, SOLUTION INTRAVENOUS at 10:03

## 2025-03-03 RX ADMIN — PIPERACILLIN SODIUM AND TAZOBACTAM SODIUM 4.5 G: 4; .5 INJECTION, POWDER, LYOPHILIZED, FOR SOLUTION INTRAVENOUS at 05:03

## 2025-03-03 RX ADMIN — PIPERACILLIN SODIUM AND TAZOBACTAM SODIUM 4.5 G: 4; .5 INJECTION, POWDER, LYOPHILIZED, FOR SOLUTION INTRAVENOUS at 10:03

## 2025-03-03 RX ADMIN — LACTULOSE 30 G: 10 SOLUTION ORAL at 10:03

## 2025-03-03 RX ADMIN — MUPIROCIN: 20 OINTMENT TOPICAL at 10:03

## 2025-03-03 RX ADMIN — SODIUM CHLORIDE, POTASSIUM CHLORIDE, SODIUM LACTATE AND CALCIUM CHLORIDE: 600; 310; 30; 20 INJECTION, SOLUTION INTRAVENOUS at 06:03

## 2025-03-03 RX ADMIN — RIFAXIMIN 550 MG: 550 TABLET ORAL at 10:03

## 2025-03-03 RX ADMIN — PIPERACILLIN SODIUM AND TAZOBACTAM SODIUM 4.5 G: 4; .5 INJECTION, POWDER, LYOPHILIZED, FOR SOLUTION INTRAVENOUS at 01:03

## 2025-03-03 RX ADMIN — MUPIROCIN: 20 OINTMENT TOPICAL at 08:03

## 2025-03-03 NOTE — PLAN OF CARE
03/03/25 1536   Discharge Assessment   Assessment Type Discharge Planning Assessment   Confirmed/corrected address, phone number and insurance Yes   Confirmed Demographics Correct on Facesheet   Source of Information facility verbal report   Communicated AFSHAN with patient/caregiver Date not available/Unable to determine   Reason For Admission actue hepatitis   People in Home facility resident   Facility Arrived From: [facility]   Do you expect to return to your current living situation? Yes   Do you have help at home or someone to help you manage your care at home? Yes   Who are your caregiver(s) and their phone number(s)? [facility]   Prior to hospitilization cognitive status: Unable to Assess   Current cognitive status: Unable to Assess   Home Layout Able to live on 1st floor   Patient currently being followed by outpatient case management? No   Do you currently have service(s) that help you manage your care at home? No   Do you take prescription medications? Yes   Do you have prescription coverage? Yes   Do you have any problems affording any of your prescribed medications? No   Is the patient taking medications as prescribed? yes   Who is going to help you get home at discharge? [facility] staff   Are you on dialysis? No   Do you take coumadin? No   Discharge Plan A Return to nursing home   Discharge Plan B Return to Nursing Home   DME Needed Upon Discharge  none   Transition of Care Barriers Other (see comments)  (Pt aphasic)   OTHER   Name(s) of People in Home facility resident     Pt is a resident at [facility]. Unable to obtain info from pt and no contacts in chart.   Spoke to nurse Aline at [facility] and she provided contacts. Updated in Epic.   Update sent to [facility] via [employer]. Per nurse at [facility], pt does not have an advance directive. Dr. Coleman notified.

## 2025-03-03 NOTE — PROGRESS NOTES
[facility]  Hospital Medicine Progress Note        Chief Complaint: No chief complaint on file.        Screening for Social Drivers for health:  Patient screened for food insecurity, housing instability, transportation needs, utility difficulties, and interpersonal safety (select all that apply as identified as concern)  []Housing or Food  []Transportation Needs  []Utility Difficulties  []Interpersonal safety  [x]None      Patient information was obtained from patient, patient's family, past medical records and ER records.  ED records were reviewed in detail and documented below     HISTORY OF PRESENT ILLNESS:   Kirill Payan is a 54 y.o. male who  has no past medical history on file.. The patient presented to [facility] on 3/1/2025 with a primary complaint of elevated liver enzymes        53M w/ hx HTN, HLD, schizophrenia, CVA with residual expressive aphasia presented 2/10/24 as a transfer from [facility] for GI consultation.  Patient seen at bedside is aphasic.     Chart reviewed with vitals stable on room air   Labs significant for bilirubin levels of 10.4, markedly elevated, ammonia levels of 101.6, ALP levels of 237, AST 63, ALT 28, creatinine of 4.13, BUN 57.3, CO2 19, sodium 146, hemoglobin 9.2 grams/dL      Patient is admitted to hospitalist service for further management of hyperbilirubinemia and hepatic encephalopathy.  GI has been consulted currently awaiting MRCP         Prior chart shows  2/2025--he had abdominal pain, found to have acute cholecystitis.  Patient was admitted with GI and surgery consultations.  He underwent lap herberth 2/11/24.  He received a 10 day course of zosyn.  IOC during lap herberth was negative for intraductal abnormality however he had persistent hyperbilirubinemia on lab post op concerning for possible retained stone.  He was monitored closely however bili did eventually trend down to normal.  He developed acute pancreatitis post op on 2/13/24 with  markedly elevated lipase and inflammatory changes of the pancreas on CT.  This was treated conservatively with bowel rest, pain control, IV fluids.  He is now clinically improved, tolerating a diet, pain is control, bowels are moving.  He is stable for DC back to the nursing home.  F/u with facility provider in 1 week.         Admitted to hospitalist service for further management        Patient currently being managed for hyperbilirubinemia, direct, acute encephalopathy due to hepatic encephalopathy, acute kidney injury.    Today's information   Patient seen and examined at bedside, he remains encephalopathic   No family members at bedside   Vitals reviewed and stable on room air   Creatinine worsened to 4.8   CO2 17, sodium 148   Bilirubin remains elevated and worsening at 12.3   MRCP results reviewed no significant dilatation reported       Case was discussed with patient's nurse and  on the floor.    Objective/physical exam:  GENERAL: Awake and in NAD  HEENT: jaundiced,,  NC/AT, EOMI, PERRL.  NECK: Supple  LUNGS: CTA B/L,  CVS: RRR, S1S2 positive  GI/: Soft, NT/ND, bowel sounds positive. Peg tube   EXTREMITIES: Peripheral pulses 2+, no peripheral edema  DERM: Warm, dry.  No rashes or lesions noted.  NEURO: encephalopathic,   aphasic, chronic deficits from prior stroke   PSYCH: Cooperative, appropriate mood and affect        ASSESSMENT & PLAN:   Hyperbilirubinemia -jaundice  Elevated ALP levels   Hepatic encephalopathy with elevated ammonia levels   Anemia, mild   Mildly elevated sodium levels   Metabolic acidosis   SANTHOSH versus CKD   Cva s/p PEG tube status     History of prior CVA with aphasia and PEG tube in place, schizophrenia      Plan  Bilirubin remains elevated and worsening at 12.3   MRCP results reviewed no significant dilatation reported   Follow up with GI recommendations   Continue IV Zosyn   on lactulose and rifaximin   Remain NPO   Consult renal for worsening kidney function   Begin  bicarb drip  Consult palliative Care for goals of care  Telemetry monitoring   Trend CMP daily   Home meds currently on hold we need to resume his psych meds sooner      DVT prophylaxis SCDs for now            VTE Prophylaxis: will be placed on  SCD for DVT prophylaxis and will be advised to be as mobile as possible and sit in a chair as tolerated     Patient condition:  /Guarded/ Serious/ Critical       Critical care time 39 minutes   Metabolic acidosis on bicarb drip     All diagnosis and differential diagnosis have been reviewed; assessment and plan has been documented; I have personally reviewed the labs and test results that are presently available; I have reviewed the patients medication list; I have reviewed the consulting providers response and recommendations. I have reviewed or attempted to review medical records based upon their availability    All of the patient's questions have been  addressed and answered. Patient's is agreeable to the above stated plan. I will continue to monitor closely and make adjustments to medical management as needed.    Portions of this note dictated using EMR integrated voice recognition software, and may be subject to voice recognition errors not corrected at proofreading. Please contact writer for clarification if needed.     VITAL SIGNS: 24 HRS MIN & MAX LAST   Temp  Min: 97.7 °F (36.5 °C)  Max: 98.4 °F (36.9 °C) 98.4 °F (36.9 °C)   BP  Min: 102/63  Max: 116/68 110/71   Pulse  Min: 56  Max: 69  69   Resp  Min: 18  Max: 18 18   SpO2  Min: 93 %  Max: 97 % (!) 93 %     I have reviewed the following labs:  Recent Labs   Lab 08/12/24  0441 08/13/24  0433 08/14/24  0500   WBC 7.19 5.66 5.53   RBC 4.95 4.46* 4.39*   HGB 14.8 13.6* 13.4*   HCT 43.5 39.0* 38.4*   MCV 87.9 87.4 87.5   MCH 29.9 30.5 30.5   MCHC 34.0 34.9 34.9   RDW 12.8 12.7 12.7    206 200   MPV 9.0 9.0 9.4     Recent Labs   Lab 08/12/24  0441 08/13/24  0433 08/14/24  0500 08/15/24  0508   * 133* 132*  135*   K 3.5 3.4* 3.3* 4.1   CL 97* 101 98 101   CO2 24 22* 23 22*   BUN 20.0 19.6 16.7 15.4   CREATININE 1.05 1.00 0.99 0.92   CALCIUM 9.3 9.0 8.9 9.2   MG 2.60 2.10 1.80 2.00   ALBUMIN 3.6 3.4 3.3*  --    ALKPHOS 112 103 97  --    ALT 22 19 20  --    AST 24 20 25  --    BILITOT 0.8 0.7 0.7  --      Microbiology Results (last 7 days)       ** No results found for the last 168 hours. **          _____________________________________________________________________    Malnutrition Status:    Scheduled Med:   apixaban  5 mg Oral BID    aspirin  81 mg Oral Daily    atorvastatin  40 mg Oral QHS    diltiaZEM  360 mg Oral Daily    docusate sodium  50 mg Oral BID    ergocalciferol  50,000 Units Oral Q7 Days    guaiFENesin  600 mg Oral BID    insulin glargine U-100  15 Units Subcutaneous QHS    QUEtiapine  150 mg Oral Nightly      Continuous Infusions:     PRN Meds:    Current Facility-Administered Medications:     acetaminophen, 1,000 mg, Oral, Q6H PRN    aluminum-magnesium hydroxide-simethicone, 30 mL, Oral, QID PRN    bisacodyL, 10 mg, Rectal, Daily PRN    dextrose 10%, 12.5 g, Intravenous, PRN    dextrose 10%, 25 g, Intravenous, PRN    glucagon (human recombinant), 1 mg, Intramuscular, PRN    glucose, 16 g, Oral, PRN    glucose, 24 g, Oral, PRN    guaiFENesin 100 mg/5 ml, 200 mg, Oral, Q4H PRN    HYDROcodone-acetaminophen, 1 tablet, Oral, Q4H PRN    insulin aspart U-100, 1-10 Units, Subcutaneous, QID (AC + HS) PRN    melatonin, 6 mg, Oral, Nightly PRN    naloxone, 0.02 mg, Intravenous, PRN    ondansetron, 4 mg, Intravenous, Q4H PRN    prochlorperazine, 5 mg, Intravenous, Q6H PRN    senna-docusate 8.6-50 mg, 1 tablet, Oral, BID PRN    sodium chloride 0.9%, 10 mL, Intravenous, PRN     Radiology:  I have personally reviewed the following imaging and agree with the radiologist.     Cardiac catheterization  Procedure performed in the Invasive Lab    - See Procedure Log link below for nursing documentation    - See OpNote on  Surgeries Tab for physician findings    - See Imaging Tab for radiologist dictation      Kuldip Haq MD  Department of Hospital Medicine   [facility]   08/18/2024

## 2025-03-03 NOTE — CONSULTS
Patient Name: Kirill Payan   MRN: [id number]   Admission Date: 3/1/2025   Hospital Length of Stay: 2   Attending Provider: Kuldip Haq MD   Consulting Provider: Nicanor Coleman M.D.  Reason for Consult: Goals of Care  Primary Care Physician: No, Primary Doctor     Principal Problem: <principal problem not specified>     Patient information was obtained from ER records and primary team.      Final diagnoses:  [K75.9] Hepatitis       We reviewed the patient's current clinical status with the nurse. We reviewed clinical documentation, labs and imaging.       Advance Care Planning     Date: 03/03/2025    Code Status  In light of the patients advanced and life limiting illness,I engaged the the family in a voluntary conversation about the patient's preferences for care  at the very end of life. The patient wishes to have a natural, peaceful death.  Along those lines, the patient does not wish to have CPR or other invasive treatments performed when his heart and/or breathing stops. I communicated to the family that a DNR order would be placed in his medical record to reflect this preference.    A total of 25 min was spent on advance care planning, goals of care discussion, emotional support, formulating and communicating prognosis and exploring burden/benefit of various approaches of treatment. This discussion occurred on a fully voluntary basis with the verbal consent of the patient and/or family.     [facility]  I engaged the family in a voluntary conversation about advance care planning and we specifically addressed what the goals of care would be moving forward, in light of the patient's change in clinical status, specifically acute hospitalization secondary to hyperammonemia, hyperbilirubinemia and jaundice, evidence of probable acute on chronic liver failure and acute renal failure.  The patient at baseline is bed-bound with chronic debility status post CVA.  He has expressive aphasia, PEG tube secondary to  dysphagia and a history of schizophrenia.  Approximately a year ago he underwent cholecystectomy with postop pancreatitis and persistent elevated bilirubin levels which have apparently improved prior to discharge.  He has been living in a nursing home in [place], [place].    I attempted to communicate with the patient but he was unable to speak, likely his baseline.  He was unable to follow commands or communicate in any other way.  I contacted the patient's mother-in-law, Sarita who stated that she has been the caregiver for her daughter, the patient's wife as well as the patient for a few years now.  She stated that her daughter has some emotional/mental problems.  She requested that I discuss the patient's case with her so she could later communicate to her daughter in a way that her daughter would better be able to receive and accept the information and assist with decision making on the patient's behalf.  Other family members or 2 sisters who do not have surrogate decision making rights.  There are no other next of kin according to his mother-in-law.    I reviewed the patient's current clinical case and the potential risks to his recovery. I explained that we are treating his conditions with a hope for any improvement to his condition.  She was being followed I asked if the patient has a history of alcohol misuse disorder which could have contributed to chronic liver disease.  She stated that she does not know and that she and her daughter did not know the patient before he had stroke related problems.  Should the patient fail to recover from a renal standpoint, I reviewed with her which he felt the patient would elect regarding the option of hemodialysis.  She stated that she will discuss this with her daughter. However, she feels that the patient would not elect hemodialysis due to the risks to him of further suffering and discomfort requiring procedures and a treatment that can cause increased weakness and  fatigue.  She stated that the patient has been unhappy in his current life and would not wish to live longer with burden some treatments.  She admits that he is a PEG tube but has tried to pull this out several times.    She clarified that she in the patient's wife would agree that the patient would wish to be a DNR code status, or to allow a natural death in the event of a cardiopulmonary arrest in his current state.    We discussed the option of home hospice care if the patient improves to allow discharge to his home.   I discussed the hospice option and its benefits.    Hospice review:     I reviewed the benefits of home hospice care, including aggressive symptom-based management with the intent to avoid future hospitalizations which may increase increase the risk of having a negative effect on her quality of life. I reviewed the benefit of regular visits by an assigned RN and 24/7 on call RN to address uncontrolled symptoms which may precipitate into a symptom crisis. This may prevent unwanted ER visits or hospitalizations ordinarily necessary to manage symptoms of an advanced illness. I also reviewed benefit of regular CNA visits and the option of  and  visitations. I explained that all medications related to her diagnosis and symptom related medications would be covered by hospice, including oxygen, a hospital bed and other durable medical equipment.      She states that she would be in favor of this option but would like to speak further with her daughter before making a final decision.  We did specifically address the patient's likely prognosis, which is poor.  We explored the patient's values and preferences for future care.  The family endorses that what is most important right now is to focus on spending time at home, avoiding the hospital, symptom/pain control, quality of life, even if it means sacrificing a little time, improvement in condition but with limits to invasive therapies,  and comfort and QOL     Accordingly, we have decided that the best plan to meet the patient's goals includes continuing with treatment and possibly enrolling in hospice upon discharge.    A total of 25 min was spent on advance care planning, goals of care discussion, emotional support, formulating and communicating prognosis and exploring burden/benefit of various approaches of treatment. This discussion occurred on a fully voluntary basis with the verbal consent of the patient and/or family.     Hospice  I did explain the role for hospice care at this stage of the patient's illness, including its ability to help the patient live with the best quality of life possible.  We willbe making a hospice referral if patient's wife is in agreement.        Symptom review:    UTO to obtain due to current encephalopathy    Assessment and Plan:    Goals of care/counseling  Acute hepatic encephalopathy  Hyperbilirubinemia  Acute renal failure  History of CVA with expressive aphasia, dysphagia  , peg tube  History of schizophrenia    We will follow up continue goals of care discussion with the patient's mother-in-law, patient's wife to include possible completion of [place] post document, referral to home hospice care upon discharge and completion of discussion today regarding other goals.      History of Present Illness:     54-year-old male with a history of CVA, nonverbal status secondary to expressive aphasia, dysphagia, peg tube status post cholecystectomy, bed-bound state, lives in nursing home.  Patient is currently hospitalized secondary to altered mental status, jaundice with elevated ammonia levels and acute kidney injury.  We were consulted to review goals of care with the patient's family.      Active Ambulatory Problems     Diagnosis Date Noted    No Active Ambulatory Problems     Resolved Ambulatory Problems     Diagnosis Date Noted    Acute respiratory failure with hypoxia 08/18/2023     No Additional Past Medical  History        No past surgical history on file.     Review of patient's allergies indicates:  No Known Allergies     Current Medications[1]       Current Facility-Administered Medications:     aluminum-magnesium hydroxide-simethicone, 30 mL, Oral, QID PRN    bisacodyL, 10 mg, Rectal, Daily PRN    dextrose 50%, 12.5 g, Intravenous, PRN    dextrose 50%, 25 g, Intravenous, PRN    glucagon (human recombinant), 1 mg, Intramuscular, PRN    glucose, 16 g, Oral, PRN    glucose, 24 g, Oral, PRN    melatonin, 6 mg, Oral, Nightly PRN    polyethylene glycol, 17 g, Oral, BID PRN    sodium chloride 0.9%, 10 mL, Intravenous, PRN     No family history on file.     Review of Systems   Unable to perform ROS: Patient nonverbal          12 point review of systems conducted, negative except as stated in the history of present illness. See HPI for details.      Objective:   /72   Pulse 83   Temp 97.6 °F (36.4 °C) (Oral)   Resp 18   SpO2 97%      Physical Exam  Vitals reviewed.   Constitutional:       Appearance: He is ill-appearing and toxic-appearing.   HENT:      Head: Normocephalic.      Right Ear: External ear normal.      Left Ear: External ear normal.      Nose: Nose normal.      Mouth/Throat:      Mouth: Mucous membranes are moist.      Pharynx: Oropharynx is clear.   Eyes:      General: Scleral icterus present.      Extraocular Movements: Extraocular movements intact.      Pupils: Pupils are equal, round, and reactive to light.   Cardiovascular:      Rate and Rhythm: Normal rate and regular rhythm.      Pulses: Normal pulses.      Heart sounds: Normal heart sounds.   Pulmonary:      Effort: Pulmonary effort is normal.      Breath sounds: Normal breath sounds.   Abdominal:      General: Abdomen is flat. Bowel sounds are normal. There is distension.      Tenderness: There is no abdominal tenderness.   Musculoskeletal:      Right lower leg: Edema present.      Left lower leg: Edema present.   Skin:     Coloration: Skin is  jaundiced.   Neurological:      Mental Status: He is disoriented.            Review of Symptoms  Review of Symptoms      Symptom Assessment (ESAS 0-10 Scale)  Unable to complete assessment due to Mental status change     CAM / Delirium:  Positive      Pain Assessment in Advanced Demential Scale (PAINAD)   Breathing - Independent of vocalization:  0  Negative vocalization:  0  Facial expression:  0  Body language:  0  Consolability:  0  Total:  0    Performance Status:  20    Living Arrangements:  Lives in nursing home    Psychosocial/Cultural:   See Palliative Psychosocial Note: Yes  **Primary  to Follow**  Palliative Care  Consult: No    Spiritual:  F - Nitza and Belief:  Restoration  A - Address in Care:  Consult       Advance Care Planning   Advance Directives:   Living Will: No    [place]: No    Do Not Resuscitate Status: Yes    Medical Power of : No      Decision Making:  Family answered questions and Patient unable to communicate due to disease severity/cognitive impairment  Goals of Care: The family endorses that what is most important right now is to focus on spending time at home, avoiding the hospital, remaining as independent as possible, symptom/pain control, quality of life, even if it means sacrificing a little time, improvement in condition but with limits to invasive therapies, and comfort and QOL     Accordingly, we have decided that the best plan to meet the patient's goals includes continuing with treatment and possibly electing hospice upon discharge.            Caregiver burden formerly assessed: Yes        > 50% of 75 min of encounter was spent in chart review, face to face discussion of goals of care, symptom assessment, coordination of care and emotional support.     Nicanor Coleman M.D.  Palliative Medicine  [facility]         [1]   Current Facility-Administered Medications:     aluminum-magnesium  hydroxide-simethicone 200-200-20 mg/5 mL suspension 30 mL, 30 mL, Oral, QID PRN, Marlyn Karla, FNP    bisacodyL suppository 10 mg, 10 mg, Rectal, Daily PRN, Marlyn Karla, FNP    dextrose 50% injection 12.5 g, 12.5 g, Intravenous, PRN, Marlyn, Karla, FNP    dextrose 50% injection 25 g, 25 g, Intravenous, PRN, Marlyn, Karla, FNP    glucagon (human recombinant) injection 1 mg, 1 mg, Intramuscular, PRN, Marlyn, Karla, FNP    glucose chewable tablet 16 g, 16 g, Oral, PRN, Marlyn, Karla, FNP    glucose chewable tablet 24 g, 24 g, Oral, PRN, Marlyn, Karla, FNP    lactulose 10 gram/15 ml solution 30 g, 30 g, Per G Tube, TID, Kuldip Haq MD, 30 g at 03/03/25 1027    melatonin tablet 6 mg, 6 mg, Oral, Nightly PRN, Marlyn, Karla, FNP    mupirocin 2 % ointment, , Nasal, BID, Marlyn Karla, FNP, Given at 03/03/25 1027    piperacillin-tazobactam (ZOSYN) 4.5 g in D5W 100 mL IVPB (MB+), 4.5 g, Intravenous, Q8H, Marlyn Karla, FNP, Last Rate: 25 mL/hr at 03/03/25 1027, 4.5 g at 03/03/25 1027    polyethylene glycol packet 17 g, 17 g, Oral, BID PRN, Marlyn Karla, FNP    rifAXIMin tablet 550 mg, 550 mg, Per G Tube, BID, Kuldip Haq MD, 550 mg at 03/03/25 1028    sodium bicarbonate 150 mEq in D5W 1,000 mL infusion, , Intravenous, Continuous, Kuldip Haq MD, Last Rate: 100 mL/hr at 03/03/25 0945, New Bag at 03/03/25 0945    sodium chloride 0.9% flush 10 mL, 10 mL, Intravenous, PRN, Marlyn, Karla, FNP

## 2025-03-03 NOTE — CONSULTS
Nephrology Initial Consult Note    Patient Name: Kirill Payan  Age: 54 y.o.  : 1970  MRN: [id number]  Admission Date: 3/1/2025    Reason for Consult:      SANTHOSH    HPI:     Kirill Payan is a 54 y.o. male who has multiple medical problems which include hypertension, hyperlipidemia, schizophrenia, recent stroke and residual expressive aphasia also recent stroke.  He also has PEG tube for nutritional support.  He had lap herberth done on 2024.  Received 10 days of Zosyn.  Persistent hyperbilirubinemia.  Workup in progress.  No recorded history of kidney problems in the past.  We are being consulted for SANTHOSH.  No family member available to give anymore history on this patient.      Current Medications[1]    No, Primary Doctor    No past medical history on file.   No past surgical history on file.   No family history on file.  Social History     Tobacco Use    Smoking status: Not on file    Smokeless tobacco: Not on file   Substance Use Topics    Alcohol use: Not on file     Prescriptions Prior to Admission[2]  Review of patient's allergies indicates:  No Known Allergies         Review of Systems:     Unable to obtain because of patient's mental status.    Objective:       VITAL SIGNS: 24 HR MIN & MAX LAST    Temp  Min: 97.6 °F (36.4 °C)  Max: 99 °F (37.2 °C)  97.6 °F (36.4 °C)        BP  Min: 107/77  Max: 119/85  108/72     Pulse  Min: 83  Max: 94  83     Resp  Min: 16  Max: 18  18    SpO2  Min: 96 %  Max: 97 %  97 %      GEN:  Awake.  Transverse had when his name is called.  Tries to vocalize some statement but is unable to do.  He acknowledged in affirmation when I said I will do workup for his kidneys.  HEENT:  Pupils reactive.  No JVD noted.  CV: RRR +S1,S2 without murmur  PULM: CTAB, unlabored  ABD: Soft, NT/ND abdomen with NABS PEG tube noted.  EXT: No cyanosis or edema emaciated.  SKIN: Warm and dry  PSYCH:  Nonverbal.            Component Value Date/Time     (H) 2025 0457    NA  146 (H) 03/02/2025 0107     02/17/2024 0836     (H) 02/10/2024 0816    K 3.8 03/03/2025 0457    K 3.7 03/02/2025 0107    K 3.6 02/17/2024 0836    K 4.2 02/10/2024 0816    CO2 17 (L) 03/03/2025 0457    CO2 19 (L) 03/02/2025 0107    CO2 27 02/17/2024 0836    CO2 23 02/10/2024 0816    BUN 64.2 (H) 03/03/2025 0457    BUN 57.3 (H) 03/02/2025 0107    BUN 5 02/17/2024 0836    BUN 27 (H) 02/10/2024 0816    CREATININE 4.80 (H) 03/03/2025 0457    CREATININE 4.13 (H) 03/02/2025 0107    CREATININE 0.78 02/17/2024 0836    CREATININE 0.99 02/10/2024 0816    CALCIUM 7.9 (L) 03/03/2025 0457    CALCIUM 8.4 03/02/2025 0107    CALCIUM 8.1 (L) 02/17/2024 0836    CALCIUM 8.5 (L) 02/10/2024 0816    PHOS 3.2 08/16/2023 0434            Component Value Date/Time    WBC 7.01 03/03/2025 0457    WBC 7.23 03/02/2025 0107    HGB 8.8 (L) 03/03/2025 0457    HGB 9.2 (L) 03/02/2025 0107    HCT 26.5 (L) 03/03/2025 0457    HCT 28.2 (L) 03/02/2025 0107     03/03/2025 0457     03/02/2025 0107         MRI MRCP Abdomen WO Contrast 3D WO Independent WS XPD   Final Result      1. Cystic structure in the gallbladder fossa possibly a dilated cystic duct, questionable communication with the common bile duct.   2. No significant biliary ductal dilatation with the common bile duct measuring only 5-6 mm.         Electronically signed by: Tim Castillo   Date:    03/02/2025   Time:    14:59      US Previous   Final Result      Xray Previous   Final Result      CT Previous   Final Result      CT Previous   Final Result      US Retroperitoneal Complete    (Results Pending)       Assessment / Plan:   Elevated BUN and creatinine is probably SANTHOSH superimposed on some CKD which needs further workup.  SANTHOSH unknown etiology at present time.    Hyperbilirubinemia   Hepatic encephalopathy  Stroke with aphasia and dysphagia, on PEG nutrition  Metabolic acidosis    Recommendations  Renal ultrasound  Urine protein creatinine  Agree with IV hydration as  clinically he is somewhat volume depleted  Monitor electrolytes renal function urine output    Thank you for this consultation         [1]   Current Facility-Administered Medications   Medication Dose Route Frequency Provider Last Rate Last Admin    aluminum-magnesium hydroxide-simethicone 200-200-20 mg/5 mL suspension 30 mL  30 mL Oral QID PRN Marlyn, Karla, FNP        bisacodyL suppository 10 mg  10 mg Rectal Daily PRN Marlyn, Karla, FNP        dextrose 50% injection 12.5 g  12.5 g Intravenous PRN [place], Karla, FNP        dextrose 50% injection 25 g  25 g Intravenous PRN Marlyn, Karla, FNP        glucagon (human recombinant) injection 1 mg  1 mg Intramuscular PRN Marlyn, Karla, FNP        glucose chewable tablet 16 g  16 g Oral PRN Marlyn, Karla, FNP        glucose chewable tablet 24 g  24 g Oral PRN Marlyn, Karla, FNP        lactulose 10 gram/15 ml solution 30 g  30 g Per G Tube TID Kuldip Haq MD   30 g at 03/03/25 1027    melatonin tablet 6 mg  6 mg Oral Nightly PRN Marlyn Karla, FNP        mupirocin 2 % ointment   Nasal BID Marlyn Karla, FNP   Given at 03/03/25 1027    piperacillin-tazobactam (ZOSYN) 4.5 g in D5W 100 mL IVPB (MB+)  4.5 g Intravenous Q8H Marlyn Karla, FNP 25 mL/hr at 03/03/25 1027 4.5 g at 03/03/25 1027    polyethylene glycol packet 17 g  17 g Oral BID PRN Marlyn Karla, FNP        rifAXIMin tablet 550 mg  550 mg Per G Tube BID Kuldip Haq MD   550 mg at 03/03/25 1028    sodium bicarbonate 150 mEq in D5W 1,000 mL infusion   Intravenous Continuous Kuldip Haq  mL/hr at 03/03/25 0945 New Bag at 03/03/25 0945    sodium chloride 0.9% flush 10 mL  10 mL Intravenous PRN Antonio Clineyssa, FNP       [2]   Medications Prior to Admission   Medication Sig Dispense Refill Last Dose/Taking    aspirin 325 MG tablet 1 tablet (325 mg total) by Per G Tube route once daily. 30 tablet 3     atorvastatin (LIPITOR) 20 MG tablet 1 tablet (20 mg total) by Per G Tube route  once daily. 90 tablet 3     multivitamin Tab 1 tablet by Per G Tube route once daily. 30 tablet 3     pantoprazole (PROTONIX) 40 MG tablet Take 40 mg by mouth once daily. Per Gtube       polyethylene glycol (GLYCOLAX) 17 gram PwPk 17 g by Per G Tube route once daily. 30 each 0     QUEtiapine (SEROQUEL) 200 MG Tab 1 tablet (200 mg total) by Per G Tube route every evening. 30 tablet 11     tiotropium bromide (SPIRIVA RESPIMAT) 2.5 mcg/actuation inhaler Inhale 2 puffs into the lungs once daily. Controller 4 g 3     ziprasidone (GEODON) 40 MG Cap Take 20 mg by mouth 2 (two) times daily. Per G tube

## 2025-03-03 NOTE — CHAPLAIN
Pt mostly unresponsive, He did open his eyes when I asked if I could pray for him. Prayed and sat with him for a bit. Will check back on him.

## 2025-03-03 NOTE — PROGRESS NOTES
"[facility]   Progress Note          SUBJECTIVE: Mentation unchanged.  No events reported overnight.       ROS: Unobtainable    MEDS: Reviewed in EMR    OBJECTIVE:  T 97.6 °F (36.4 °C)   /72   P 83   RR 18   O2 97 %  GENERAL: Chronically ill appearing; NAD; does not appear toxic  SKIN: no rash, no jaundice  HEENT: sclera non-icteric; PERRL  NECK: supple; no LAD  CHEST: CTA; nonlabored, equal expansion; no adventitious BS  CARDIOVASCULAR: RRR, S1S2; no murmur   ABDOMEN:  active bowel sounds; abdomen soft, nondistended, nontender to palpation; PEG in place  EXTREMITIES: no cyanosis or clubbing  NEURO: Nonverbal    Labs:  Recent Labs     03/02/25 0107 03/03/25  0457   WBC 7.23 7.01   RBC 3.61* 3.42*   HGB 9.2* 8.8*   HCT 28.2* 26.5*   MCV 78.1* 77.5*   MCH 25.5* 25.7*   MCHC 32.6* 33.2   RDW 15.9 16.0    202     No results for input(s): "LACTIC" in the last 72 hours.  Recent Labs     03/02/25 0107   INR 1.3   APTT 39.8*     No results for input(s): "HGBA1C", "CHOL", "TRIG", "LDL", "VLDL", "HDL" in the last 72 hours.   Recent Labs     03/02/25 0107 03/03/25  0457   * 148*   K 3.7 3.8   CO2 19* 17*   BUN 57.3* 64.2*   CREATININE 4.13* 4.80*   GLUCOSE 76 75   CALCIUM 8.4 7.9*   MG 3.00*  --    ALBUMIN 2.1* 2.0*   GLOBULIN 5.0* 4.2*   ALKPHOS 237* 211*   ALT 28 29   AST 63* 70*   BILITOT 10.4* 12.3*   LIPASE 13  --      No results for input(s): "BNP", "CPK", "TROPONINI" in the last 72 hours.       Imaging: Reviewed pertinent imaging    ASSESSMENT / PLAN:  He is a 54-year-old male known to Dr. Neftaly Todd with a PMH of CVA and resultant aphasia / dysphagia, s/p PEG, cholecystitis / choledocholithiasis, cholecystectomy 2/2024.  He was brought to the hospital from the nursing home where he resides secondary to AMS and jaundice.  Added to have an elevated ammonia on admit.  CT with fluid density in the gallbladder fossa, no biliary ductal dilatation.  Also noted large amount of " stool within the colon.  Patient does have a notable elevated creatinine of over 4, normal around 1 year ago.  Acute hepatitis panel negative.  GI consulted for further.    Jaundice/elevated bilirubin  Hyperammoniemia    SANTHOSH vs CKD  Dysphagia, s/p PEG  CVA, aphasia    MRCP with cystic structure in the gallbladder fossa, possibly a dilated cystic duct, with questionable communication with the CBD.  No significant biliary ductal dilatation, CBD measuring 5-6 mm.    Continue lactulose and Xifaxan.   CMP, INR with am labs.   Nephrology input noted.  Further to follow with MD on rounds.  No family present at time of exam.           Thank you for allowing us to participate in the care of Kirill Chris Payan.    Kaitlynn Connelly, MIRTA  [facility]

## 2025-03-03 NOTE — PLAN OF CARE
Problem: Adult Inpatient Plan of Care  Goal: Plan of Care Review  Outcome: Progressing  Goal: Patient-Specific Goal (Individualized)  Outcome: Progressing  Goal: Absence of Hospital-Acquired Illness or Injury  Outcome: Progressing  Goal: Optimal Comfort and Wellbeing  Outcome: Progressing  Goal: Readiness for Transition of Care  Outcome: Progressing     Problem: Skin Injury Risk Increased  Goal: Skin Health and Integrity  Outcome: Progressing     Problem: Fall Injury Risk  Goal: Absence of Fall and Fall-Related Injury  Outcome: Progressing     Problem: Infection  Goal: Absence of Infection Signs and Symptoms  Outcome: Progressing     Problem: Coping Ineffective  Goal: Effective Coping  Outcome: Progressing

## 2025-03-04 LAB
ALBUMIN SERPL-MCNC: 2 G/DL (ref 3.5–5)
ALBUMIN/GLOB SERPL: 0.5 RATIO (ref 1.1–2)
ALP SERPL-CCNC: 203 UNIT/L (ref 40–150)
ALT SERPL-CCNC: 24 UNIT/L (ref 0–55)
ANION GAP SERPL CALC-SCNC: 15 MEQ/L
AST SERPL-CCNC: 67 UNIT/L (ref 5–34)
BACTERIA #/AREA URNS AUTO: ABNORMAL /HPF
BASOPHILS # BLD AUTO: 0.02 X10(3)/MCL
BASOPHILS NFR BLD AUTO: 0.2 %
BILIRUB SERPL-MCNC: 14.3 MG/DL
BILIRUB UR QL STRIP.AUTO: ABNORMAL
BUN SERPL-MCNC: 61.2 MG/DL (ref 8.4–25.7)
CALCIUM SERPL-MCNC: 8 MG/DL (ref 8.4–10.2)
CHLORIDE SERPL-SCNC: 112 MMOL/L (ref 98–107)
CLARITY UR: CLEAR
CO2 SERPL-SCNC: 20 MMOL/L (ref 22–29)
COLOR UR AUTO: ABNORMAL
CREAT SERPL-MCNC: 5.09 MG/DL (ref 0.72–1.25)
CREAT UR-MCNC: 57.1 MG/DL (ref 63–166)
CREAT/UREA NIT SERPL: 12
EOSINOPHIL # BLD AUTO: 0.07 X10(3)/MCL (ref 0–0.9)
EOSINOPHIL NFR BLD AUTO: 0.9 %
ERYTHROCYTE [DISTWIDTH] IN BLOOD BY AUTOMATED COUNT: 16.2 % (ref 11.5–17)
GFR SERPLBLD CREATININE-BSD FMLA CKD-EPI: 13 ML/MIN/1.73/M2
GLOBULIN SER-MCNC: 4.2 GM/DL (ref 2.4–3.5)
GLUCOSE SERPL-MCNC: 116 MG/DL (ref 74–100)
GLUCOSE UR QL STRIP: NORMAL
HCT VFR BLD AUTO: 26.6 % (ref 42–52)
HGB BLD-MCNC: 9 G/DL (ref 14–18)
HGB UR QL STRIP: ABNORMAL
IMM GRANULOCYTES # BLD AUTO: 0.11 X10(3)/MCL (ref 0–0.04)
IMM GRANULOCYTES NFR BLD AUTO: 1.4 %
INR PPP: 1.5
KETONES UR QL STRIP: NEGATIVE
LEUKOCYTE ESTERASE UR QL STRIP: NEGATIVE
LYMPHOCYTES # BLD AUTO: 0.83 X10(3)/MCL (ref 0.6–4.6)
LYMPHOCYTES NFR BLD AUTO: 10.3 %
MCH RBC QN AUTO: 26.5 PG (ref 27–31)
MCHC RBC AUTO-ENTMCNC: 33.8 G/DL (ref 33–36)
MCV RBC AUTO: 78.5 FL (ref 80–94)
MONOCYTES # BLD AUTO: 0.99 X10(3)/MCL (ref 0.1–1.3)
MONOCYTES NFR BLD AUTO: 12.3 %
MUCOUS THREADS URNS QL MICRO: ABNORMAL /LPF
NEUTROPHILS # BLD AUTO: 6.05 X10(3)/MCL (ref 2.1–9.2)
NEUTROPHILS NFR BLD AUTO: 74.9 %
NITRITE UR QL STRIP: NEGATIVE
NRBC BLD AUTO-RTO: 1.9 %
PH UR STRIP: 6 [PH]
PLATELET # BLD AUTO: 200 X10(3)/MCL (ref 130–400)
PLATELETS.RETICULATED NFR BLD AUTO: 12.2 % (ref 0.9–11.2)
PMV BLD AUTO: ABNORMAL FL
POTASSIUM SERPL-SCNC: 3.5 MMOL/L (ref 3.5–5.1)
PROT SERPL-MCNC: 6.2 GM/DL (ref 6.4–8.3)
PROT UR QL STRIP: ABNORMAL
PROT UR STRIP-MCNC: 50.7 MG/DL
PROTHROMBIN TIME: 17.9 SECONDS (ref 12.5–14.5)
RBC # BLD AUTO: 3.39 X10(6)/MCL (ref 4.7–6.1)
RBC #/AREA URNS AUTO: ABNORMAL /HPF
SODIUM SERPL-SCNC: 147 MMOL/L (ref 136–145)
SP GR UR STRIP.AUTO: 1.02 (ref 1–1.03)
SQUAMOUS #/AREA URNS LPF: ABNORMAL /HPF
URINE PROTEIN/CREATININE RATIO (OLG): 0.9
UROBILINOGEN UR STRIP-ACNC: NORMAL
WBC # BLD AUTO: 8.07 X10(3)/MCL (ref 4.5–11.5)
WBC #/AREA URNS AUTO: ABNORMAL /HPF

## 2025-03-04 PROCEDURE — 80053 COMPREHEN METABOLIC PANEL: CPT | Performed by: INTERNAL MEDICINE

## 2025-03-04 PROCEDURE — 85025 COMPLETE CBC W/AUTO DIFF WBC: CPT | Performed by: INTERNAL MEDICINE

## 2025-03-04 PROCEDURE — 25000003 PHARM REV CODE 250: Performed by: INTERNAL MEDICINE

## 2025-03-04 PROCEDURE — 81015 MICROSCOPIC EXAM OF URINE: CPT | Performed by: STUDENT IN AN ORGANIZED HEALTH CARE EDUCATION/TRAINING PROGRAM

## 2025-03-04 PROCEDURE — 36415 COLL VENOUS BLD VENIPUNCTURE: CPT | Performed by: NURSE PRACTITIONER

## 2025-03-04 PROCEDURE — 85610 PROTHROMBIN TIME: CPT | Performed by: NURSE PRACTITIONER

## 2025-03-04 PROCEDURE — 11000001 HC ACUTE MED/SURG PRIVATE ROOM

## 2025-03-04 PROCEDURE — 25000003 PHARM REV CODE 250: Performed by: STUDENT IN AN ORGANIZED HEALTH CARE EDUCATION/TRAINING PROGRAM

## 2025-03-04 PROCEDURE — P9047 ALBUMIN (HUMAN), 25%, 50ML: HCPCS | Performed by: STUDENT IN AN ORGANIZED HEALTH CARE EDUCATION/TRAINING PROGRAM

## 2025-03-04 PROCEDURE — 63600175 PHARM REV CODE 636 W HCPCS: Performed by: STUDENT IN AN ORGANIZED HEALTH CARE EDUCATION/TRAINING PROGRAM

## 2025-03-04 PROCEDURE — 25000003 PHARM REV CODE 250

## 2025-03-04 PROCEDURE — 84156 ASSAY OF PROTEIN URINE: CPT | Performed by: INTERNAL MEDICINE

## 2025-03-04 PROCEDURE — 63600175 PHARM REV CODE 636 W HCPCS

## 2025-03-04 RX ORDER — ALBUMIN HUMAN 250 G/1000ML
25 SOLUTION INTRAVENOUS EVERY 6 HOURS
Status: COMPLETED | OUTPATIENT
Start: 2025-03-04 | End: 2025-03-05

## 2025-03-04 RX ADMIN — MUPIROCIN: 20 OINTMENT TOPICAL at 08:03

## 2025-03-04 RX ADMIN — LACTULOSE 30 G: 10 SOLUTION ORAL at 08:03

## 2025-03-04 RX ADMIN — RIFAXIMIN 550 MG: 550 TABLET ORAL at 08:03

## 2025-03-04 RX ADMIN — RIFAXIMIN 550 MG: 550 TABLET ORAL at 09:03

## 2025-03-04 RX ADMIN — ALBUMIN (HUMAN) 25 G: 5 SOLUTION INTRAVENOUS at 06:03

## 2025-03-04 RX ADMIN — MUPIROCIN: 20 OINTMENT TOPICAL at 09:03

## 2025-03-04 RX ADMIN — LACTULOSE 30 G: 10 SOLUTION ORAL at 09:03

## 2025-03-04 RX ADMIN — SODIUM BICARBONATE: 84 INJECTION, SOLUTION INTRAVENOUS at 10:03

## 2025-03-04 RX ADMIN — ALBUMIN (HUMAN) 25 G: 5 SOLUTION INTRAVENOUS at 01:03

## 2025-03-04 RX ADMIN — PIPERACILLIN SODIUM AND TAZOBACTAM SODIUM 4.5 G: 4; .5 INJECTION, POWDER, LYOPHILIZED, FOR SOLUTION INTRAVENOUS at 12:03

## 2025-03-04 RX ADMIN — ALBUMIN (HUMAN) 25 G: 5 SOLUTION INTRAVENOUS at 11:03

## 2025-03-04 NOTE — PROGRESS NOTES
Nephrology consult follow up note    HPI:      Kirill Payan is a 54 y.o. male  who has multiple medical problems which include hypertension, hyperlipidemia, schizophrenia, recent stroke and residual expressive aphasia also recent stroke.  He also has PEG tube for nutritional support.  He had lap herberth done on 02/11/2024.  Received 10 days of Zosyn.  Persistent hyperbilirubinemia.  Workup in progress.  No recorded history of kidney problems in the past.  We are being consulted for SANTHOSH.    Interval history:     03/04/2025  No acute events overnight.  Patient does move his left hand to command.  He is not currently getting tube feedings.  He has been getting IV fluids, however, they are not running at the prescribed rate.     Review of Systems:     Unable to obtain ROS due to mental status    Past medical, family, surgical, and social history reviewed and unchanged from initial consult note.     Objective:       VITAL SIGNS: 24 HR MIN & MAX LAST    Temp  Min: 97.4 °F (36.3 °C)  Max: 98.8 °F (37.1 °C)  98.2 °F (36.8 °C)        BP  Min: 108/78  Max: 124/87  124/87     Pulse  Min: 82  Max: 87  84     Resp  Min: 16  Max: 20  16    SpO2  Min: 95 %  Max: 98 %  97 %      GEN: Chronically ill appearing AA male in NAD  HEENT: Conjunctiva icteric  CV: RRR +S1,S2 without murmur  PULM: CTAB, unlabored  ABD: Soft, NT/ND abdomen with NABS  EXT: No cyanosis or edema  SKIN: Warm and dry  PSYCH:  Eyes open, but not verbally responsive.  Only moves left hand to command.  Dialysis access:  No dialysis access            Component Value Date/Time     (H) 03/04/2025 0532     (H) 03/03/2025 0457     02/17/2024 0836     (H) 02/10/2024 0816    K 3.5 03/04/2025 0532    K 3.8 03/03/2025 0457    K 3.6 02/17/2024 0836    K 4.2 02/10/2024 0816    CO2 20 (L) 03/04/2025 0532    CO2 17 (L) 03/03/2025 0457    CO2 27 02/17/2024 0836    CO2 23 02/10/2024 0816    BUN 61.2 (H) 03/04/2025 0532    BUN 64.2 (H) 03/03/2025 0454     BUN 5 02/17/2024 0836    BUN 27 (H) 02/10/2024 0816    CREATININE 5.09 (H) 03/04/2025 0532    CREATININE 4.80 (H) 03/03/2025 0457    CREATININE 0.78 02/17/2024 0836    CREATININE 0.99 02/10/2024 0816    CALCIUM 8.0 (L) 03/04/2025 0532    CALCIUM 7.9 (L) 03/03/2025 0457    CALCIUM 8.1 (L) 02/17/2024 0836    CALCIUM 8.5 (L) 02/10/2024 0816    PHOS 3.2 08/16/2023 0434            Component Value Date/Time    WBC 8.07 03/04/2025 0532    WBC 7.01 03/03/2025 0457    HGB 9.0 (L) 03/04/2025 0532    HGB 8.8 (L) 03/03/2025 0457    HCT 26.6 (L) 03/04/2025 0532    HCT 26.5 (L) 03/03/2025 0457     03/04/2025 0532     03/03/2025 0457         Imaging reviewed      Assessment / Plan:       There are no hospital problems to display for this patient.      Acute kidney injury -  baseline of 0.8  Hyperbilirubinemia   Hepatic encephalopathy  Stroke with aphasia and dysphagia, on PEG nutrition  Metabolic acidosis  Hypernatremia    Plan:  Renal function continues to worsen.  Recommend continued volume repletion with IV fluids, or free water flushes if tube feedings are restarted.  Hypernatremia persists.  Continue IV fluids but change to D5W with 50 mEq bicarb at 100 mL/hr.  Hyperbilirubinemia worsening.  We will give albumin 25 g q.6 hours today in case this is hepatorenal.  Check urinalysis and UPCR.  Overall prognosis appears poor, and palliative Care is following.  Patient has been made a DNR, and there was talks to discharging with hospice.    Darius Hernandez DO  Nephrology  [facility]  Clinic number: [phone]      Note was created with the assistance of electronic Dictation Services.  Note was reviewed to decrease errors, however, these may still be present.  Please contact me about questions or concerns with the dictation.

## 2025-03-04 NOTE — PLAN OF CARE
Chart reviewed. Awaiting repeat U/S to assess for possible bile leak. Multiple imaging modalities with no evidence of biliary obstruction. Will check back once done

## 2025-03-04 NOTE — PLAN OF CARE
Problem: Adult Inpatient Plan of Care  Goal: Plan of Care Review  3/3/2025 2021 by Sarita Olivarez RN  Outcome: Progressing  3/3/2025 1722 by Sarita Olivarez RN  Outcome: Progressing  Goal: Patient-Specific Goal (Individualized)  3/3/2025 2021 by Sarita Olivarez RN  Outcome: Progressing  3/3/2025 1722 by Sarita Olivarez RN  Outcome: Progressing  Goal: Absence of Hospital-Acquired Illness or Injury  3/3/2025 2021 by Sarita Olivarez RN  Outcome: Progressing  3/3/2025 1722 by Sarita Olivarez RN  Outcome: Progressing  Goal: Optimal Comfort and Wellbeing  3/3/2025 2021 by Sarita Olivarez RN  Outcome: Progressing  3/3/2025 1722 by Sarita Olivarez RN  Outcome: Progressing  Goal: Readiness for Transition of Care  3/3/2025 2021 by Sarita Olivarez RN  Outcome: Progressing  3/3/2025 1722 by Sarita Olivarez RN  Outcome: Progressing     Problem: Skin Injury Risk Increased  Goal: Skin Health and Integrity  3/3/2025 2021 by Sarita Olivarez RN  Outcome: Progressing  3/3/2025 1722 by Sarita Olivarez RN  Outcome: Progressing     Problem: Fall Injury Risk  Goal: Absence of Fall and Fall-Related Injury  3/3/2025 2021 by Sarita Olivarez RN  Outcome: Progressing  3/3/2025 1722 by Sarita Olivarez RN  Outcome: Progressing     Problem: Infection  Goal: Absence of Infection Signs and Symptoms  3/3/2025 2021 by Sarita Olivarez RN  Outcome: Progressing  3/3/2025 1722 by Sarita Olivarez RN  Outcome: Progressing     Problem: Coping Ineffective  Goal: Effective Coping  3/3/2025 2021 by Sarita Olivarez RN  Outcome: Progressing  3/3/2025 1722 by Sarita Olivarez RN  Outcome: Progressing

## 2025-03-05 LAB
ALBUMIN SERPL-MCNC: 2.6 G/DL (ref 3.5–5)
ALBUMIN/GLOB SERPL: 0.7 RATIO (ref 1.1–2)
ALP SERPL-CCNC: 172 UNIT/L (ref 40–150)
ALT SERPL-CCNC: 24 UNIT/L (ref 0–55)
AMMONIA PLAS-MSCNC: 94.3 UMOL/L (ref 18–72)
ANION GAP SERPL CALC-SCNC: 15 MEQ/L
AST SERPL-CCNC: 65 UNIT/L (ref 5–34)
BASOPHILS # BLD AUTO: 0.03 X10(3)/MCL
BASOPHILS NFR BLD AUTO: 0.4 %
BILIRUB SERPL-MCNC: 17 MG/DL
BUN SERPL-MCNC: 59.2 MG/DL (ref 8.4–25.7)
CALCIUM SERPL-MCNC: 8.2 MG/DL (ref 8.4–10.2)
CHLORIDE SERPL-SCNC: 106 MMOL/L (ref 98–107)
CO2 SERPL-SCNC: 25 MMOL/L (ref 22–29)
CREAT SERPL-MCNC: 5.18 MG/DL (ref 0.72–1.25)
CREAT/UREA NIT SERPL: 11
EOSINOPHIL # BLD AUTO: 0.08 X10(3)/MCL (ref 0–0.9)
EOSINOPHIL NFR BLD AUTO: 0.9 %
ERYTHROCYTE [DISTWIDTH] IN BLOOD BY AUTOMATED COUNT: 16.3 % (ref 11.5–17)
GFR SERPLBLD CREATININE-BSD FMLA CKD-EPI: 12 ML/MIN/1.73/M2
GLOBULIN SER-MCNC: 3.8 GM/DL (ref 2.4–3.5)
GLUCOSE SERPL-MCNC: 108 MG/DL (ref 74–100)
HCT VFR BLD AUTO: 27.3 % (ref 42–52)
HGB BLD-MCNC: 9.2 G/DL (ref 14–18)
IMM GRANULOCYTES # BLD AUTO: 0.15 X10(3)/MCL (ref 0–0.04)
IMM GRANULOCYTES NFR BLD AUTO: 1.8 %
INR PPP: 1.7
LYMPHOCYTES # BLD AUTO: 0.94 X10(3)/MCL (ref 0.6–4.6)
LYMPHOCYTES NFR BLD AUTO: 11 %
MCH RBC QN AUTO: 26.9 PG (ref 27–31)
MCHC RBC AUTO-ENTMCNC: 33.7 G/DL (ref 33–36)
MCV RBC AUTO: 79.8 FL (ref 80–94)
MONOCYTES # BLD AUTO: 1.11 X10(3)/MCL (ref 0.1–1.3)
MONOCYTES NFR BLD AUTO: 13 %
NEUTROPHILS # BLD AUTO: 6.21 X10(3)/MCL (ref 2.1–9.2)
NEUTROPHILS NFR BLD AUTO: 72.9 %
NRBC BLD AUTO-RTO: 1.8 %
PATH REV: NORMAL
PLATELET # BLD AUTO: 189 X10(3)/MCL (ref 130–400)
PLATELETS.RETICULATED NFR BLD AUTO: 14.8 % (ref 0.9–11.2)
PMV BLD AUTO: 14.5 FL (ref 7.4–10.4)
POTASSIUM SERPL-SCNC: 3.2 MMOL/L (ref 3.5–5.1)
PROT SERPL-MCNC: 6.4 GM/DL (ref 6.4–8.3)
PROTHROMBIN TIME: 19.5 SECONDS (ref 12.5–14.5)
RBC # BLD AUTO: 3.42 X10(6)/MCL (ref 4.7–6.1)
SODIUM SERPL-SCNC: 146 MMOL/L (ref 136–145)
WBC # BLD AUTO: 8.52 X10(3)/MCL (ref 4.5–11.5)

## 2025-03-05 PROCEDURE — 82140 ASSAY OF AMMONIA: CPT | Performed by: INTERNAL MEDICINE

## 2025-03-05 PROCEDURE — 25000003 PHARM REV CODE 250: Performed by: INTERNAL MEDICINE

## 2025-03-05 PROCEDURE — 36415 COLL VENOUS BLD VENIPUNCTURE: CPT | Performed by: INTERNAL MEDICINE

## 2025-03-05 PROCEDURE — 25000003 PHARM REV CODE 250

## 2025-03-05 PROCEDURE — 36415 COLL VENOUS BLD VENIPUNCTURE: CPT | Performed by: NURSE PRACTITIONER

## 2025-03-05 PROCEDURE — 80053 COMPREHEN METABOLIC PANEL: CPT | Performed by: INTERNAL MEDICINE

## 2025-03-05 PROCEDURE — 11000001 HC ACUTE MED/SURG PRIVATE ROOM

## 2025-03-05 PROCEDURE — 85610 PROTHROMBIN TIME: CPT | Performed by: NURSE PRACTITIONER

## 2025-03-05 PROCEDURE — 63600175 PHARM REV CODE 636 W HCPCS: Performed by: STUDENT IN AN ORGANIZED HEALTH CARE EDUCATION/TRAINING PROGRAM

## 2025-03-05 PROCEDURE — P9047 ALBUMIN (HUMAN), 25%, 50ML: HCPCS | Performed by: STUDENT IN AN ORGANIZED HEALTH CARE EDUCATION/TRAINING PROGRAM

## 2025-03-05 PROCEDURE — 25000003 PHARM REV CODE 250: Performed by: STUDENT IN AN ORGANIZED HEALTH CARE EDUCATION/TRAINING PROGRAM

## 2025-03-05 PROCEDURE — 99233 SBSQ HOSP IP/OBS HIGH 50: CPT | Mod: ,,, | Performed by: INTERNAL MEDICINE

## 2025-03-05 PROCEDURE — 85025 COMPLETE CBC W/AUTO DIFF WBC: CPT | Performed by: INTERNAL MEDICINE

## 2025-03-05 RX ORDER — ALUMINUM HYDROXIDE, MAGNESIUM HYDROXIDE, AND SIMETHICONE 1200; 120; 1200 MG/30ML; MG/30ML; MG/30ML
30 SUSPENSION ORAL EVERY 6 HOURS
Status: DISCONTINUED | OUTPATIENT
Start: 2025-03-05 | End: 2025-03-06 | Stop reason: HOSPADM

## 2025-03-05 RX ADMIN — SODIUM BICARBONATE: 84 INJECTION, SOLUTION INTRAVENOUS at 01:03

## 2025-03-05 RX ADMIN — LACTULOSE 30 G: 10 SOLUTION ORAL at 08:03

## 2025-03-05 RX ADMIN — ALUMINUM HYDROXIDE, MAGNESIUM HYDROXIDE, AND DIMETHICONE 30 ML: 200; 20; 200 SUSPENSION ORAL at 03:03

## 2025-03-05 RX ADMIN — RIFAXIMIN 550 MG: 550 TABLET ORAL at 08:03

## 2025-03-05 RX ADMIN — ALBUMIN (HUMAN) 25 G: 5 SOLUTION INTRAVENOUS at 06:03

## 2025-03-05 RX ADMIN — ALUMINUM HYDROXIDE, MAGNESIUM HYDROXIDE, AND DIMETHICONE 30 ML: 200; 20; 200 SUSPENSION ORAL at 06:03

## 2025-03-05 RX ADMIN — MUPIROCIN: 20 OINTMENT TOPICAL at 09:03

## 2025-03-05 RX ADMIN — MUPIROCIN: 20 OINTMENT TOPICAL at 08:03

## 2025-03-05 RX ADMIN — Medication 6 MG: at 11:03

## 2025-03-05 RX ADMIN — ALBUMIN (HUMAN) 25 G: 5 SOLUTION INTRAVENOUS at 12:03

## 2025-03-05 RX ADMIN — ALBUMIN (HUMAN) 25 G: 5 SOLUTION INTRAVENOUS at 05:03

## 2025-03-05 RX ADMIN — RIFAXIMIN 550 MG: 550 TABLET ORAL at 09:03

## 2025-03-05 RX ADMIN — LACTULOSE 30 G: 10 SOLUTION ORAL at 09:03

## 2025-03-05 RX ADMIN — LACTULOSE 30 G: 10 SOLUTION ORAL at 03:03

## 2025-03-05 NOTE — PROGRESS NOTES
[facility]  Hospital Medicine Progress Note        Chief Complaint: confused          HISTORY OF PRESENT ILLNESS:   Kirill Payan is a 54 y.o. male who  has no past medical history on file.. The patient presented to [facility] on 3/1/2025 with a primary complaint of elevated liver enzymes        53M w/ hx HTN, HLD, schizophrenia, CVA with residual expressive aphasia presented 2/10/24 [place] [facility].  Patient seen at bedside is aphasic.     Chart reviewed with vitals stable on room air   Labs significant for bilirubin levels of 10.4, markedly elevated, ammonia levels of 101.6, ALP levels of 237, AST 63, ALT 28, creatinine of 4.13, BUN 57.3, CO2 19, sodium 146, hemoglobin 9.2 grams/dL      Patient is admitted to hospitalist service for further management of hyperbilirubinemia and hepatic encephalopathy.  GI has been consulted currently awaiting MRCP      Admitted to hospitalist service for further management        Patient currently being managed for hyperbilirubinemia, direct, acute encephalopathy due to hepatic encephalopathy, acute kidney injury.    Today's information   Patient seen and examined at bedside,   No family members at bedside   Vitals reviewed and stable on room air   Creatinine worsened to 5.09, BUN 61.2, CO2 20  Bilirubin continues to increase to 14.3, INR 1.5   Ultrasound renal shows medical renal disease   Blood cultures negative at 48 hours   GI suspecting possible bile leak sent for ultrasound of the abdomen   Patient currently DNR appreciate palliative Care input    Case was discussed with patient's nurse and  on the floor.    Objective/physical exam:  GENERAL: Awake and in NAD  HEENT: jaundiced,,  NC/AT, EOMI, PERRL.  NECK: Supple  LUNGS: CTA B/L,  CVS: RRR, S1S2 positive  GI/: Soft, NT/ND, bowel sounds positive. Peg tube   EXTREMITIES: Peripheral pulses 2+, no peripheral edema  DERM: Warm, dry.  No rashes or lesions  noted.  NEURO: encephalopathic,   aphasic, chronic deficits from prior stroke   PSYCH: Cooperative, appropriate mood and affect        ASSESSMENT & PLAN:   Hyperbilirubinemia -jaundice, worsening  Elevated ALP levels   Hepatic encephalopathy with elevated ammonia levels   Anemia, mild   Mildly elevated sodium levels   Metabolic acidosis   SANTHOSH , worsening  Cva s/p PEG tube status     History of prior CVA with aphasia and PEG tube in place, schizophrenia      Plan  Bilirubin remains elevated and worsening at 14.3  MRCP results reviewed no significant dilatation reported   Follow up with GI recommendations -follow up ultrasound abdomen results  Discontinue IV Zosyn  on lactulose and rifaximin   Remain NPO   Follow up renal recs to continue bicarb drip, creatinine continue to worsen  Appreciate palliative Care input currently DNR, patient might have to be transitioned to comfort measures if bilirubin and creatinine continue to worsen, he is a poor candidate for dialysis or liver Transplant  Telemetry monitoring   Trend CMP daily   Home meds currently on hold we need to resume his psych meds soon      DVT prophylaxis SCDs for now            VTE Prophylaxis: will be placed on  SCD for DVT prophylaxis and will be advised to be as mobile as possible and sit in a chair as tolerated     Patient condition:  /Guarded/ Serious/ Critical       Critical care time 39 minutes   Metabolic acidosis on bicarb drip     All diagnosis and differential diagnosis have been reviewed; assessment and plan has been documented; I have personally reviewed the labs and test results that are presently available; I have reviewed the patients medication list; I have reviewed the consulting providers response and recommendations. I have reviewed or attempted to review medical records based upon their availability    All of the patient's questions have been  addressed and answered. Patient's is agreeable to the above stated plan. I will continue to  monitor closely and make adjustments to medical management as needed.    Portions of this note dictated using EMR integrated voice recognition software, and may be subject to voice recognition errors not corrected at proofreading. Please contact writer for clarification if needed.     VITAL SIGNS: 24 HRS MIN & MAX LAST   Temp  Min: 97.7 °F (36.5 °C)  Max: 98.4 °F (36.9 °C) 98.4 °F (36.9 °C)   BP  Min: 102/63  Max: 116/68 110/71   Pulse  Min: 56  Max: 69  69   Resp  Min: 18  Max: 18 18   SpO2  Min: 93 %  Max: 97 % (!) 93 %     I have reviewed the following labs:  Recent Labs   Lab 08/12/24  0441 08/13/24  0433 08/14/24  0500   WBC 7.19 5.66 5.53   RBC 4.95 4.46* 4.39*   HGB 14.8 13.6* 13.4*   HCT 43.5 39.0* 38.4*   MCV 87.9 87.4 87.5   MCH 29.9 30.5 30.5   MCHC 34.0 34.9 34.9   RDW 12.8 12.7 12.7    206 200   MPV 9.0 9.0 9.4     Recent Labs   Lab 08/12/24  0441 08/13/24  0433 08/14/24  0500 08/15/24  0508   * 133* 132* 135*   K 3.5 3.4* 3.3* 4.1   CL 97* 101 98 101   CO2 24 22* 23 22*   BUN 20.0 19.6 16.7 15.4   CREATININE 1.05 1.00 0.99 0.92   CALCIUM 9.3 9.0 8.9 9.2   MG 2.60 2.10 1.80 2.00   ALBUMIN 3.6 3.4 3.3*  --    ALKPHOS 112 103 97  --    ALT 22 19 20  --    AST 24 20 25  --    BILITOT 0.8 0.7 0.7  --      Microbiology Results (last 7 days)       ** No results found for the last 168 hours. **          _____________________________________________________________________    Malnutrition Status:    Scheduled Med:   apixaban  5 mg Oral BID    aspirin  81 mg Oral Daily    atorvastatin  40 mg Oral QHS    diltiaZEM  360 mg Oral Daily    docusate sodium  50 mg Oral BID    ergocalciferol  50,000 Units Oral Q7 Days    guaiFENesin  600 mg Oral BID    insulin glargine U-100  15 Units Subcutaneous QHS    QUEtiapine  150 mg Oral Nightly      Continuous Infusions:     PRN Meds:    Current Facility-Administered Medications:     acetaminophen, 1,000 mg, Oral, Q6H PRN    aluminum-magnesium hydroxide-simethicone,  30 mL, Oral, QID PRN    bisacodyL, 10 mg, Rectal, Daily PRN    dextrose 10%, 12.5 g, Intravenous, PRN    dextrose 10%, 25 g, Intravenous, PRN    glucagon (human recombinant), 1 mg, Intramuscular, PRN    glucose, 16 g, Oral, PRN    glucose, 24 g, Oral, PRN    guaiFENesin 100 mg/5 ml, 200 mg, Oral, Q4H PRN    HYDROcodone-acetaminophen, 1 tablet, Oral, Q4H PRN    insulin aspart U-100, 1-10 Units, Subcutaneous, QID (AC + HS) PRN    melatonin, 6 mg, Oral, Nightly PRN    naloxone, 0.02 mg, Intravenous, PRN    ondansetron, 4 mg, Intravenous, Q4H PRN    prochlorperazine, 5 mg, Intravenous, Q6H PRN    senna-docusate 8.6-50 mg, 1 tablet, Oral, BID PRN    sodium chloride 0.9%, 10 mL, Intravenous, PRN     Radiology:  I have personally reviewed the following imaging and agree with the radiologist.     Cardiac catheterization  Procedure performed in the Invasive Lab    - See Procedure Log link below for nursing documentation    - See OpNote on Surgeries Tab for physician findings    - See Imaging Tab for radiologist dictation      Kuldip Haq MD  Department of Hospital Medicine   [facility]   08/18/2024

## 2025-03-05 NOTE — PROGRESS NOTES
"[facility]   Progress Note          SUBJECTIVE:  AF, VSS.  No reported events overnight.  Mentation unchanged.  Awake and moving around, no verbalization.       ROS: Unobtainable    MEDS: Reviewed in EMR    OBJECTIVE:  T 98 °F (36.7 °C)   /86   P 80   RR 18   O2 97 %  GENERAL: Chronically ill appearing; NAD; does not appear toxic  SKIN: no rash, no jaundice  HEENT: sclera non-icteric; PERRL  NECK: supple; no LAD  CHEST: CTA; nonlabored, equal expansion; no adventitious BS  CARDIOVASCULAR: RRR, S1S2; no murmur   ABDOMEN:  active bowel sounds; abdomen soft, nondistended, nontender to palpation; PEG in place  EXTREMITIES: no cyanosis or clubbing  NEURO: Nonverbal    Labs:  Recent Labs     03/04/25  0532 03/05/25  0439   WBC 8.07 8.52   RBC 3.39* 3.42*   HGB 9.0* 9.2*   HCT 26.6* 27.3*   MCV 78.5* 79.8*   MCH 26.5* 26.9*   MCHC 33.8 33.7   RDW 16.2 16.3    189     No results for input(s): "LACTIC" in the last 72 hours.  Recent Labs     03/04/25  0532   INR 1.5*     No results for input(s): "HGBA1C", "CHOL", "TRIG", "LDL", "VLDL", "HDL" in the last 72 hours.   Recent Labs     03/03/25  0457 03/04/25  0532 03/05/25  0439   * 147* 146*   K 3.8 3.5 3.2*   CO2 17* 20* 25   BUN 64.2* 61.2* 59.2*   CREATININE 4.80* 5.09* 5.18*   GLUCOSE 75 116* 108*   CALCIUM 7.9* 8.0* 8.2*   ALBUMIN 2.0* 2.0* 2.6*   GLOBULIN 4.2* 4.2* 3.8*   ALKPHOS 211* 203* 172*   ALT 29 24 24   AST 70* 67* 65*   BILITOT 12.3* 14.3* 17.0*   FERRITIN 824.81*  --   --    IRON 108  --   --    TIBC 191*  --   --      No results for input(s): "BNP", "CPK", "TROPONINI" in the last 72 hours.       Imaging: Reviewed pertinent imaging    ASSESSMENT / PLAN:  He is a 54-year-old male known to Dr. Neftaly Todd with a PMH of CVA and resultant aphasia / dysphagia, s/p PEG, cholecystitis / choledocholithiasis, cholecystectomy 2/2024.  He was brought to the hospital from the nursing home where he resides secondary to AMS and " jaundice.  Added to have an elevated ammonia on admit.  CT with fluid density in the gallbladder fossa, no biliary ductal dilatation.  Also noted large amount of stool within the colon.  Patient does have a notable elevated creatinine of over 4, normal around 1 year ago.  Acute hepatitis panel negative.  GI consulted for further.    Jaundice/elevated bilirubin  Hyperammoniemia    SANTHOSH vs CKD  Dysphagia, s/p PEG  CVA, aphasia      -3/2 MRCP with cystic structure in the gallbladder fossa, possibly a dilated cystic duct, with questionable communication with the CBD.  No significant biliary ductal dilatation, CBD measuring 5-6 mm.    -3/4 Abdominal US: Postop cholecystectomy with small fluid collection or remnant at the gallbladder fossa.           Abdominal US with fluid collection or remnant at GB fossa.    May need IR consult for aspiration of fluid.    F/u INR today.  Noted Tbili continues to rise.   Continue lactulose and Xifaxan.   Further to follow with MD on rounds.  No family present at time of exam.       Thank you for allowing us to participate in the care of Kirill John Payan.    Kaitlynn Connelly, MIRTA  [facility]

## 2025-03-05 NOTE — PROGRESS NOTES
Patient Name: Kirill Payan   MRN: [id number]   Admission Date: 3/1/2025   Hospital Length of Stay: 4   Attending Provider: Kuldip Hqa MD   Consulting Provider: Nicanor Coleman MD    Primary Care Physician:  No, Primary Doctor     Principal Problem: <principal problem not specified>       Final diagnoses:  [K75.9] Hepatitis      Goals of care review:    After examining the patient, I contacted the hospital medicine physician, I spoke to the GI physician and I contacted the patient's mother-in-law, Sarita who previously stated that she was 1st contact and the surrogate decision maker on behalf of the patient..  I provided her with an update.  I explained that the patient appears similar to my last exam 2 days ago in which attempts were made to communicate simple messages to him.  I asked him to blink twice or 3 times, squeeze my hand, turn his head are various options to answer yes and no question.  I used a loud voice.  He did not answer any questions.  He is likely significantly encephalopathic.    I explained to his mother-in-law that liver function studies as well as liver enzymes, particularly total bilirubin, and ammonia levels have continued to worsen despite aggressive treatment.  I provided recommendations by myself and hospital Medicine which are to consider a focus on comfort measures and to avoid further aggressive treatments that may fail to offer any further benefit but may add a risk of further discomfort secondary to procedures.  She was in favor of setting up home hospice care as previously discussed.  She stated that her daughter, the patient's wife was also in favor of this.  Plan will be to return to his home at [facility] with home hospice care.  I will consult case management to contact her to review hospice options.    Symptom review:    UTO due to confusion    Assessment/Plan:     Goals of care/counseling  Acute on chronic liver failure with jaundice   Acute renal  failure  Dysphagia with PEG tube  History of CVA with expressive aphasia    As stated above, the patient will be discharged to his home at [facility] with hospice care involved.    Interval History:     See above.      Active Ambulatory Problems     Diagnosis Date Noted    No Active Ambulatory Problems     Resolved Ambulatory Problems     Diagnosis Date Noted    Acute respiratory failure with hypoxia 08/18/2023     No Additional Past Medical History        No past surgical history on file.     Review of patient's allergies indicates:  No Known Allergies     Current Medications[1]       Current Facility-Administered Medications:     aluminum-magnesium hydroxide-simethicone, 30 mL, Oral, QID PRN    bisacodyL, 10 mg, Rectal, Daily PRN    dextrose 50%, 12.5 g, Intravenous, PRN    dextrose 50%, 25 g, Intravenous, PRN    glucagon (human recombinant), 1 mg, Intramuscular, PRN    glucose, 16 g, Oral, PRN    glucose, 24 g, Oral, PRN    melatonin, 6 mg, Oral, Nightly PRN    polyethylene glycol, 17 g, Oral, BID PRN    sodium chloride 0.9%, 10 mL, Intravenous, PRN     No family history on file.       Review of Systems   Unable to perform ROS: Mental status change            Objective:   /86   Pulse 80   Temp 98 °F (36.7 °C) (Oral)   Resp 18   SpO2 97%      Physical Exam   Constitutional: He appears toxic. He appears ill.   HENT:   Head: Normocephalic.   Right Ear: External ear normal.   Left Ear: External ear normal.   Nose: Nose normal.   Mouth/Throat: Mucous membranes are moist. Oropharynx is clear.   Eyes: Pupils are equal, round, and reactive to light. Scleral icterus is present.   Cardiovascular: Normal rate, regular rhythm, normal heart sounds and normal pulses. Pulmonary:      Effort: Pulmonary effort is normal.      Breath sounds: Normal breath sounds.     Abdominal: Soft. Bowel sounds are normal. He exhibits distension.   Musculoskeletal:      Right lower leg: Edema present.      Left lower  leg: Edema present.   Neurological: He is disoriented.   Skin: There is jaundice.   Vitals reviewed.         Review of Symptoms  Review of Symptoms      Symptom Assessment (ESAS 0-10 Scale)  Pain:  0  Dyspnea:  0  Anxiety:  0  Nausea:  0  Depression:  0  Anorexia:  0  Fatigue:  0  Insomnia:  0  Restlessness:  0  Agitation:  0     CAM / Delirium:  Positive      Living Arrangements:  Lives in nursing home    Psychosocial/Cultural:   See Palliative Psychosocial Note: Yes  **Primary  to Follow**  Palliative Care  Consult: No    Spiritual:  F - Nitza and Belief:  Hoahaoism  A - Address in Care:  Consult       Advance Care Planning   Advance Directives:   Living Will: No    LaPOST: No    Do Not Resuscitate Status: Yes    Medical Power of : No      Decision Making:  Family answered questions and Patient unable to communicate due to disease severity/cognitive impairment  Goals of Care: What is most important right now is to focus on spending time at home, avoiding the hospital, symptom/pain control, quality of life, even if it means sacrificing a little time, improvement in condition but with limits to invasive therapies, comfort and QOL , remaining as independent as possible. Accordingly, we have decided that the best plan to meet the patient's goals includes comfort focused treatments with hospice.                > 50% of 38 min of encounter was spent in chart review, face to face discussion of goals of care, symptom assessment, coordination of care and emotional support.       Nicanor Coleman MD  Palliative Medicine  [facility]        [1]   Current Facility-Administered Medications:     albumin human 25% bottle 25 g, 25 g, Intravenous, Q6H, Darius Hernandez T, DO, Last Rate: 100 mL/hr at 03/05/25 1219, 25 g at 03/05/25 1219    aluminum-magnesium hydroxide-simethicone 200-200-20 mg/5 mL suspension 30 mL, 30 mL, Oral, QID PRN, Karla Cline,  FNP    bisacodyL suppository 10 mg, 10 mg, Rectal, Daily PRN, Antonio Clineyssa, FNP    dextrose 50% injection 12.5 g, 12.5 g, Intravenous, PRN, Sunshine, Karla, FNP    dextrose 50% injection 25 g, 25 g, Intravenous, PRN, Marlyn, Karla, FNP    glucagon (human recombinant) injection 1 mg, 1 mg, Intramuscular, PRN, Marlyn, Karla, FNP    glucose chewable tablet 16 g, 16 g, Oral, PRN, Sunshine, Karla, FNP    glucose chewable tablet 24 g, 24 g, Oral, PRN, Marlyn, Karla, FNP    lactulose 10 gram/15 ml solution 30 g, 30 g, Per G Tube, TID, Kuldip Haq MD, 30 g at 03/05/25 0842    melatonin tablet 6 mg, 6 mg, Oral, Nightly PRN, Sunshine, Karal, FNP    mupirocin 2 % ointment, , Nasal, BID, Antonio Clineyssa, FNP, Given at 03/05/25 0842    polyethylene glycol packet 17 g, 17 g, Oral, BID PRN, Marlyn Karla, FNP    rifAXIMin tablet 550 mg, 550 mg, Per G Tube, BID, Kuldip Haq MD, 550 mg at 03/05/25 0842    sodium bicarbonate 50 mEq in D5W 1,000 mL infusion, , Intravenous, Continuous, Darius Hernandez DO, Last Rate: 100 mL/hr at 03/05/25 1305, New Bag at 03/05/25 1305    sodium chloride 0.9% flush 10 mL, 10 mL, Intravenous, PRN, Marlyn, Karla, FNP

## 2025-03-05 NOTE — PROGRESS NOTES
Nephrology consult follow up note    HPI:      Kirill Payan is a 54 y.o. male  who has multiple medical problems which include hypertension, hyperlipidemia, schizophrenia, recent stroke and residual expressive aphasia also recent stroke.  He also has PEG tube for nutritional support.  He had lap herberth done on 02/11/2024.  Received 10 days of Zosyn.  Persistent hyperbilirubinemia.  Workup in progress.  No recorded history of kidney problems in the past.  We are being consulted for SANTHOSH.    Interval history:     03/04/2025  No acute events overnight.  Patient does move his left hand to command.  He is not currently getting tube feedings.  He has been getting IV fluids, however, they are not running at the prescribed rate.     03/05/2024  No clinical change.  Patient is still getting IV fluids, and not getting tube feedings.  Opens eyes to stimulation, but not verbally responsive.    Review of Systems:     Unable to obtain ROS due to mental status    Past medical, family, surgical, and social history reviewed and unchanged from initial consult note.     Objective:       VITAL SIGNS: 24 HR MIN & MAX LAST    Temp  Min: 97.8 °F (36.6 °C)  Max: 98.5 °F (36.9 °C)  98 °F (36.7 °C)        BP  Min: 107/73  Max: 129/83  121/86     Pulse  Min: 80  Max: 87  80     Resp  Min: 16  Max: 20  18    SpO2  Min: 95 %  Max: 100 %  97 %      GEN: Chronically ill appearing AA male in NAD  HEENT: Conjunctiva icteric  CV: RRR +S1,S2 without murmur  PULM: CTAB, unlabored  ABD: Soft, NT/ND abdomen with NABS  EXT: No cyanosis or edema  SKIN: Warm and dry  PSYCH:  Eyes open, but not verbally responsive.  Only moves left hand to command.  Dialysis access:  No dialysis access            Component Value Date/Time     (H) 03/05/2025 0439     (H) 03/04/2025 0532     02/17/2024 0836     (H) 02/10/2024 0816    K 3.2 (L) 03/05/2025 0439    K 3.5 03/04/2025 0532    K 3.6 02/17/2024 0836    K 4.2 02/10/2024 0816    CO2 25  03/05/2025 0439    CO2 20 (L) 03/04/2025 0532    CO2 27 02/17/2024 0836    CO2 23 02/10/2024 0816    BUN 59.2 (H) 03/05/2025 0439    BUN 61.2 (H) 03/04/2025 0532    BUN 5 02/17/2024 0836    BUN 27 (H) 02/10/2024 0816    CREATININE 5.18 (H) 03/05/2025 0439    CREATININE 5.09 (H) 03/04/2025 0532    CREATININE 0.78 02/17/2024 0836    CREATININE 0.99 02/10/2024 0816    CALCIUM 8.2 (L) 03/05/2025 0439    CALCIUM 8.0 (L) 03/04/2025 0532    CALCIUM 8.1 (L) 02/17/2024 0836    CALCIUM 8.5 (L) 02/10/2024 0816    PHOS 3.2 08/16/2023 0434            Component Value Date/Time    WBC 8.52 03/05/2025 0439    WBC 8.07 03/04/2025 0532    HGB 9.2 (L) 03/05/2025 0439    HGB 9.0 (L) 03/04/2025 0532    HCT 27.3 (L) 03/05/2025 0439    HCT 26.6 (L) 03/04/2025 0532     03/05/2025 0439     03/04/2025 0532         Imaging reviewed      Assessment / Plan:       There are no hospital problems to display for this patient.      Acute kidney injury -  baseline of 0.8  Hyperbilirubinemia   Hepatic encephalopathy  Stroke with aphasia and dysphagia, on PEG nutrition  Metabolic acidosis  Hypernatremia    Plan:  Renal function continues to worsen.  Hyperbilirubinemia worsening.  Continues to have hypernatremia.  Seems like plan is going to be to discharge him back to nursing home with hospice.  Patient is not a candidate for outpatient dialysis given his poor functional status and comorbidities.  Not much further to add from a Nephrology standpoint.  Maintain hydration with IV fluids, or PEG tube free water flushes unless full comfort care measures are in place.  We will sign off at this time.  Please call if we can be of further assistance.    Darius Hernandez DO  Nephrology  [facility]  Clinic number: [phone]      Note was created with the assistance of electronic Dictation Services.  Note was reviewed to decrease errors, however, these may still be present.  Please contact me about questions or concerns with the  dictation.

## 2025-03-05 NOTE — PROGRESS NOTES
[facility]  Hospital Medicine Progress Note        Chief Complaint: confused          HISTORY OF PRESENT ILLNESS:   53M w/ hx HTN, HLD, schizophrenia, CVA with residual expressive aphasia presented 2/10/24 as a [place] [facility] [place] for elevated lfts.  Patient seen at bedside is aphasic.     Chart reviewed with vitals stable on room air   Labs significant for bilirubin levels of 10.4, markedly elevated, ammonia levels of 101.6, ALP levels of 237, AST 63, ALT 28, creatinine of 4.13, BUN 57.3, CO2 19, sodium 146, hemoglobin 9.2 grams/dL      Patient is admitted to hospitalist service for further management of hyperbilirubinemia and hepatic encephalopathy.  GI has been consulted currently awaiting MRCP      Admitted to hospitalist service for further management        Patient currently being managed for hyperbilirubinemia, direct, acute encephalopathy due to hepatic encephalopathy, acute kidney injury.    Today's information   Patient seen and examined at bedside, patient is encephalopathic due to elevated ammonia and bilirubin levels  No family members at bedside   Vitals reviewed and stable on room air   Creatinine and bilirubin levels continue to trend upwards 5.18 and 17 respectively   Prognosis remains very poor patient is hospice appropriate at this time.   Patient currently DNR appreciate palliative Care input    Case was discussed with patient's nurse and  on the floor.    Objective/physical exam:  GENERAL: Awake and in NAD  HEENT: jaundiced,,  NC/AT, EOMI, PERRL.  NECK: Supple  LUNGS: CTA B/L,  CVS: RRR, S1S2 positive  GI/: Soft, NT/ND, bowel sounds positive. Peg tube   EXTREMITIES: Peripheral pulses 2+, no peripheral edema  DERM: Warm, dry.  No rashes or lesions noted.  NEURO: encephalopathic,   aphasic, chronic deficits from prior stroke   PSYCH: Cooperative, appropriate mood and affect        ASSESSMENT & PLAN:   Hyperbilirubinemia -jaundice,  worsening  Elevated ALP levels   Hepatic encephalopathy with elevated ammonia levels   Anemia, mild   Mildly elevated sodium levels   Metabolic acidosis   SANTHOSH , worsening  Cva s/p PEG tube status     History of prior CVA with aphasia and PEG tube in place, schizophrenia      Plan  Creatinine and bilirubin levels continue to trend upwards 5.18 and 17 respectively   Prognosis remains very poor patient is hospice appropriate at this time.  MRCP results reviewed no significant dilatation reported   GI recommends to consult Radiology for IR drainage of the fluid described in the gallbladder fossa  Discontinued IV Zosyn on 3/4  on lactulose and rifaximin   Remain NPO ,check KUB -abd remains distended   Follow up renal recs to continue bicarb drip, creatinine continue to worsen  Appreciate palliative Care input currently DNR, patient might have to be transitioned to comfort measures if bilirubin and creatinine continue to worsen, he is a poor candidate for dialysis or liver Transplant  Telemetry monitoring   Trend CMP daily   Home meds currently on hold we need to resume his psych meds soon      DVT prophylaxis SCDs for now            VTE Prophylaxis: will be placed on  SCD for DVT prophylaxis and will be advised to be as mobile as possible and sit in a chair as tolerated     Patient condition:  /Guarded/ Serious/ Critical       Critical care time 39 minutes   Metabolic acidosis on bicarb drip     All diagnosis and differential diagnosis have been reviewed; assessment and plan has been documented; I have personally reviewed the labs and test results that are presently available; I have reviewed the patients medication list; I have reviewed the consulting providers response and recommendations. I have reviewed or attempted to review medical records based upon their availability    All of the patient's questions have been  addressed and answered. Patient's is agreeable to the above stated plan. I will continue to monitor  closely and make adjustments to medical management as needed.    Portions of this note dictated using EMR integrated voice recognition software, and may be subject to voice recognition errors not corrected at proofreading. Please contact writer for clarification if needed.     VITAL SIGNS: 24 HRS MIN & MAX LAST   Temp  Min: 97.7 °F (36.5 °C)  Max: 98.4 °F (36.9 °C) 98.4 °F (36.9 °C)   BP  Min: 102/63  Max: 116/68 110/71   Pulse  Min: 56  Max: 69  69   Resp  Min: 18  Max: 18 18   SpO2  Min: 93 %  Max: 97 % (!) 93 %     I have reviewed the following labs:  Recent Labs   Lab 08/12/24  0441 08/13/24  0433 08/14/24  0500   WBC 7.19 5.66 5.53   RBC 4.95 4.46* 4.39*   HGB 14.8 13.6* 13.4*   HCT 43.5 39.0* 38.4*   MCV 87.9 87.4 87.5   MCH 29.9 30.5 30.5   MCHC 34.0 34.9 34.9   RDW 12.8 12.7 12.7    206 200   MPV 9.0 9.0 9.4     Recent Labs   Lab 08/12/24 0441 08/13/24  0433 08/14/24  0500 08/15/24  0508   * 133* 132* 135*   K 3.5 3.4* 3.3* 4.1   CL 97* 101 98 101   CO2 24 22* 23 22*   BUN 20.0 19.6 16.7 15.4   CREATININE 1.05 1.00 0.99 0.92   CALCIUM 9.3 9.0 8.9 9.2   MG 2.60 2.10 1.80 2.00   ALBUMIN 3.6 3.4 3.3*  --    ALKPHOS 112 103 97  --    ALT 22 19 20  --    AST 24 20 25  --    BILITOT 0.8 0.7 0.7  --      Microbiology Results (last 7 days)       ** No results found for the last 168 hours. **          _____________________________________________________________________    Malnutrition Status:    Scheduled Med:   apixaban  5 mg Oral BID    aspirin  81 mg Oral Daily    atorvastatin  40 mg Oral QHS    diltiaZEM  360 mg Oral Daily    docusate sodium  50 mg Oral BID    ergocalciferol  50,000 Units Oral Q7 Days    guaiFENesin  600 mg Oral BID    insulin glargine U-100  15 Units Subcutaneous QHS    QUEtiapine  150 mg Oral Nightly      Continuous Infusions:     PRN Meds:    Current Facility-Administered Medications:     acetaminophen, 1,000 mg, Oral, Q6H PRN    aluminum-magnesium hydroxide-simethicone, 30 mL,  Oral, QID PRN    bisacodyL, 10 mg, Rectal, Daily PRN    dextrose 10%, 12.5 g, Intravenous, PRN    dextrose 10%, 25 g, Intravenous, PRN    glucagon (human recombinant), 1 mg, Intramuscular, PRN    glucose, 16 g, Oral, PRN    glucose, 24 g, Oral, PRN    guaiFENesin 100 mg/5 ml, 200 mg, Oral, Q4H PRN    HYDROcodone-acetaminophen, 1 tablet, Oral, Q4H PRN    insulin aspart U-100, 1-10 Units, Subcutaneous, QID (AC + HS) PRN    melatonin, 6 mg, Oral, Nightly PRN    naloxone, 0.02 mg, Intravenous, PRN    ondansetron, 4 mg, Intravenous, Q4H PRN    prochlorperazine, 5 mg, Intravenous, Q6H PRN    senna-docusate 8.6-50 mg, 1 tablet, Oral, BID PRN    sodium chloride 0.9%, 10 mL, Intravenous, PRN     Radiology:  I have personally reviewed the following imaging and agree with the radiologist.     Cardiac catheterization  Procedure performed in the Invasive Lab    - See Procedure Log link below for nursing documentation    - See OpNote on Surgeries Tab for physician findings    - See Imaging Tab for radiologist dictation      Kuldip Haq MD  Department of Hospital Medicine   [facility]   08/18/2024

## 2025-03-05 NOTE — PLAN OF CARE
Clinical udpates sent to [facility] via [facility]. Pt is a resident there.

## 2025-03-05 NOTE — PLAN OF CARE
Consult for hospice noted. Pt unable to provide info or make any decisions. Per [facility], pt's residence, they only allow [facility] or [facility] hospice. Verbal freedom of choice signed by Sarita Broussard, who lives out of town,   pt contact as per MD. Both above choices given and she chose [facility]. Confirmed with Kari at [facility] that they can go to the facility. Referral sent to [facility] hospice via manual fax# [phone].

## 2025-03-06 VITALS
TEMPERATURE: 99 F | HEART RATE: 85 BPM | RESPIRATION RATE: 18 BRPM | SYSTOLIC BLOOD PRESSURE: 113 MMHG | DIASTOLIC BLOOD PRESSURE: 80 MMHG | OXYGEN SATURATION: 96 %

## 2025-03-06 PROBLEM — G93.41 ENCEPHALOPATHY, METABOLIC: Status: ACTIVE | Noted: 2025-03-06

## 2025-03-06 PROCEDURE — 25000003 PHARM REV CODE 250: Performed by: INTERNAL MEDICINE

## 2025-03-06 PROCEDURE — 25000003 PHARM REV CODE 250: Performed by: STUDENT IN AN ORGANIZED HEALTH CARE EDUCATION/TRAINING PROGRAM

## 2025-03-06 RX ADMIN — MUPIROCIN: 20 OINTMENT TOPICAL at 09:03

## 2025-03-06 RX ADMIN — ALUMINUM HYDROXIDE, MAGNESIUM HYDROXIDE, AND DIMETHICONE 30 ML: 200; 20; 200 SUSPENSION ORAL at 12:03

## 2025-03-06 RX ADMIN — ALUMINUM HYDROXIDE, MAGNESIUM HYDROXIDE, AND DIMETHICONE 30 ML: 200; 20; 200 SUSPENSION ORAL at 06:03

## 2025-03-06 RX ADMIN — SODIUM BICARBONATE: 84 INJECTION, SOLUTION INTRAVENOUS at 12:03

## 2025-03-06 RX ADMIN — LACTULOSE 30 G: 10 SOLUTION ORAL at 09:03

## 2025-03-06 RX ADMIN — RIFAXIMIN 550 MG: 550 TABLET ORAL at 09:03

## 2025-03-06 NOTE — PLAN OF CARE
03/06/25 1419   Final Note   Assessment Type Final Discharge Note   Anticipated Discharge Disposition Bo Fac   What phone number can be called within the next 1-3 days to see how you are doing after discharge? [phone]   Post-Acute Status   Post-Acute Authorization Placement;Hospice   Hospice Status Set-up Complete/Auth obtained   Discharge Delays None known at this time     Pt being discharged back to [facility] where he is a resident with [facility]. DC summary/AVS sent to [facility] via [facility]. Pt placed in will call with Umer at hospitals. Nurse notified. No further dc needs at this time.    3:35pm-removed pt from will call with Shu at [facility] as per nurse request.

## 2025-03-06 NOTE — PLAN OF CARE
Pt being discharged to Hospice      Problem: Adult Inpatient Plan of Care  Goal: Plan of Care Review  3/6/2025 1533 by Tabitha Abarca RN  Outcome: Adequate for Care Transition  3/6/2025 1533 by Tabitha Abarca RN  Outcome: Adequate for Care Transition  Goal: Patient-Specific Goal (Individualized)  3/6/2025 1533 by Tabitha Abarca RN  Outcome: Adequate for Care Transition  3/6/2025 1533 by Tabitha Abarca RN  Outcome: Adequate for Care Transition  Goal: Absence of Hospital-Acquired Illness or Injury  3/6/2025 1533 by Tabitha Abarca RN  Outcome: Adequate for Care Transition  3/6/2025 1533 by Tabitha Abarca RN  Outcome: Adequate for Care Transition  Goal: Optimal Comfort and Wellbeing  3/6/2025 1533 by Tabitha Abarca RN  Outcome: Adequate for Care Transition  3/6/2025 1533 by Tabitha Abarca RN  Outcome: Adequate for Care Transition  Goal: Readiness for Transition of Care  3/6/2025 1533 by Tabitha Abarca RN  Outcome: Adequate for Care Transition  3/6/2025 1533 by Tabitha Abarca RN  Outcome: Adequate for Care Transition     Problem: Skin Injury Risk Increased  Goal: Skin Health and Integrity  3/6/2025 1533 by Tabitha Abarca RN  Outcome: Adequate for Care Transition  3/6/2025 1533 by Tabitha Abarca RN  Outcome: Adequate for Care Transition     Problem: Fall Injury Risk  Goal: Absence of Fall and Fall-Related Injury  3/6/2025 1533 by Tabitha Abarca RN  Outcome: Adequate for Care Transition  3/6/2025 1533 by Tabitha Abarca RN  Outcome: Adequate for Care Transition     Problem: Infection  Goal: Absence of Infection Signs and Symptoms  3/6/2025 1533 by Tabitha Abarca RN  Outcome: Adequate for Care Transition  3/6/2025 1533 by Tabitha Abarca RN  Outcome: Adequate for Care Transition     Problem: Coping Ineffective  Goal: Effective Coping  3/6/2025 1533 by Tabitha Abarca RN  Outcome: Adequate for Care Transition  3/6/2025 1533 by Tabitha Abarca RN  Outcome: Adequate for Care Transition

## 2025-03-06 NOTE — PLAN OF CARE
[facility] sent discharge orders/AVS and clinicals to [facility] via [employer] Fax. Report information was given to nurse.

## 2025-03-06 NOTE — DISCHARGE SUMMARY
[facility] Hospital Medicine Discharge Summary    Admit Date: 3/1/2025  Discharge Date and Time: 3/6/70161:22 AM  Admitting Physician: JANUARY Team  Discharging Physician: Alessandro Argueta MD.  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  Patient seen at bedside and was aphasic.     Chart reviewed with vitals stable on room air   Labs significant for bilirubin levels of 10.4, markedly elevated, ammonia levels of 101.6, ALP levels of 237, AST 63, ALT 28, creatinine of 4.13, BUN 57.3, CO2 19, sodium 146, hemoglobin 9.2 grams/dL      Patient is admitted to hospitalist service for further management of hyperbilirubinemia and hepatic encephalopathy.  GI team was consulted.   MRCP done and showed  Impression:     1. Cystic structure in the gallbladder fossa possibly a dilated cystic duct, questionable communication with the common bile duct.  2. No significant biliary ductal dilatation with the common bile duct measuring only 5-6 mm.    GI diagnosed patient with possible bile leak, abscess +/- intrahepatic cholestasis. He was continued on lactulose and Xifaxan. He had no major improvement in mental status. He had poor functional status. He was seen by palliative care. Goals of care discussions was held with family. Family wanted comfort care. He was set up with hospice care and discharged back to the NH.     Over all prognosis was poor.      Admitted to hospitalist service  for further management       Pt was seen and examined on the day of discharge  Vitals:  VITAL SIGNS: 24 HRS MIN & MAX LAST   Temp  Min: 96.7 °F (35.9 °C)  Max: 98.4 °F (36.9 °C) 97.5 °F (36.4 °C)   BP  Min: 118/75  Max: 136/84 120/77   Pulse  Min: 80  Max: 84  80   Resp  Min: 12  Max: 18 16   SpO2  Min: 97 %  Max: 99 % 98 %       Physical Exam:  Heart RRR  Lungs clear   Abdomen soft and non tender   Neuro: Aphasic and right sided paralysis     Procedures Performed: No admission procedures for hospital encounter.     Significant Diagnostic Studies: See Full reports for all details    Recent Labs   Lab 03/03/25 0457 03/04/25 0532 03/05/25 0439   WBC 7.01 8.07 8.52   RBC 3.42* 3.39* 3.42*   HGB 8.8* 9.0* 9.2*   HCT 26.5* 26.6* 27.3*   MCV 77.5* 78.5* 79.8*   MCH 25.7* 26.5* 26.9*   MCHC 33.2 33.8 33.7   RDW 16.0 16.2 16.3    200 189   MPV  --   --  14.5*       Recent Labs   Lab 03/02/25 0107 03/03/25 0457 03/04/25 0532 03/05/25 0439   * 148* 147* 146*   K 3.7 3.8 3.5 3.2*   * 116* 112* 106   CO2 19* 17* 20* 25   BUN 57.3* 64.2* 61.2* 59.2*   CREATININE 4.13* 4.80* 5.09* 5.18*   CALCIUM 8.4 7.9* 8.0* 8.2*   MG 3.00*  --   --   --    ALBUMIN 2.1* 2.0* 2.0* 2.6*   ALKPHOS 237* 211* 203* 172*   ALT 28 29 24 24   AST 63* 70* 67* 65*   BILITOT 10.4* 12.3* 14.3* 17.0*        Microbiology Results (last 7 days)       Procedure Component Value Units Date/Time    Blood Culture [[id number]]  (Normal) Collected: 03/02/25 0107    Order Status: Completed Specimen: Blood, Venous Updated: 03/06/25 0202     Blood Culture No Growth At 96 Hours    Blood Culture [[id number]]  (Normal) Collected: 03/02/25 0107    Order Status: Completed Specimen: Blood, Venous Updated: 03/06/25 0202     Blood Culture No Growth At 96 Hours             X-Ray Abdomen AP 1 View  Narrative: EXAMINATION:  XR ABDOMEN AP 1 VIEW    CLINICAL HISTORY:  abd distension;    TECHNIQUE:  AP X-RAY OF THE ABDOMEN:    CPT  90622    FINDINGS:  Examination reveals some residual feces throughout the colon the gas pattern is nonspecific with no clear evidence of ileus or obstruction no abnormal masses or calcifications identified  Impression: Residual feces    Electronically signed by: Harpal Márquez  Date:    03/05/2025  Time:    13:46         Medication List        CONTINUE taking these medications      aspirin 325 MG tablet  1 tablet (325 mg total) by Per G Tube route once daily.     multivitamin Tab  1 tablet by Per G Tube route once daily.     pantoprazole 40 MG tablet  Commonly known as: PROTONIX     polyethylene glycol 17 gram Pwpk  Commonly known as: GLYCOLAX  17 g by Per G Tube route once daily.     QUEtiapine 200 MG Tab  Commonly known as: SEROQUEL  1 tablet (200 mg total) by Per G Tube route every evening.     SPIRIVA RESPIMAT 2.5 mcg/actuation inhaler  Generic drug: tiotropium bromide  Inhale 2 puffs into the lungs once daily. Controller     ziprasidone 40 MG Cap  Commonly known as: GEODON            STOP taking these medications      atorvastatin 20 MG tablet  Commonly known as: LIPITOR               Explained in detail to the patient about the discharge plan, medications, and follow-up visits. Pt understands and agrees with the treatment plan  Discharge Disposition: NH with hospice care   Discharged Condition: stable  Diet-   Dietary Orders (From admission, onward)       Start     Ordered    03/05/25 2015  Tube Feedings/Formulas 65; Fibersource HN; Peg; 150; Every 4 hours  (Tube Feedings/Formulas Order Panel)  Continuous        Question Answer Comment   Formula Rate (mL/hr): 65    Select Formula: Fibersource HN    Route: Peg    Free water flush volume (mL): 150    Free water flush frequency: Every 4 hours        03/05/25 2015 03/02/25 0030  Diet NPO  (Diet/Nutrition - [facility])  Diet effective now         03/02/25 0029                   Medications Per DC med rec  Activities as tolerated    For further  questions contact hospitalist office    Discharge time 33 minutes    For worsening symptoms, chest pain, shortness of breath, increased abdominal pain, high grade fever, stroke or stroke like symptoms, immediately go to the nearest Emergency Room or call 911 as soon as possible.      Alessandro Mast M.D, on 3/6/2025. at 9:22 AM.

## 2025-03-06 NOTE — NURSING
Pt transferring back to [facility] in [place]. Report called to VÍCTOR Parker. Pt going via ambulance. Stable VSS

## 2025-03-06 NOTE — PLAN OF CARE
Per Camille at [facility] they are waiting on pt's MIL who is out of [place] to sign consents. She'll notify me once they're signed.

## 2025-03-06 NOTE — PLAN OF CARE
[facility] sent discharge orders/AVS and clinicals via [employer]ax and [employer]  email. Transport set up in will-call with [facility]. [facility] with [facility] notified, awaiting report info.

## 2025-03-07 LAB
A2 MACROGLOB SERPL-MCNC: 330 MG/DL (ref 100–280)
ALT SERPL W P-5'-P-CCNC: 29 U/L (ref 7–55)
ANNOTATION COMMENT IMP: ABNORMAL
APO A-I SERPL-MCNC: <20 MG/DL
AST SERPL W P-5'-P-CCNC: 70 U/L (ref 8–48)
BACTERIA BLD CULT: NORMAL
BACTERIA BLD CULT: NORMAL
BILIRUB SERPL-MCNC: 12.2 MG/DL (ref 0–1.2)
CHOLEST SERPL-MCNC: 52 MG/DL
FIBROSIS STAGE SERPL QL: ABNORMAL
GGT SERPL-CCNC: 165 U/L (ref 8–61)
GLUCOSE P FAST SERPL-MCNC: 96 MG/DL (ref 70–100)
HAPTOGLOB SERPL NEPH-MCNC: 68 MG/DL (ref 30–200)
LIVER FIBR SCORE SERPL CALC.FIBROSURE: 0.99
LIVER FIBROSIS INTERPRETATION SER-IMP: ABNORMAL
NASH INTERPRETATION SERPL-IMP: ABNORMAL
SERIAL #: ABNORMAL
TRIGL SERPL-MCNC: 228 MG/DL